# Patient Record
Sex: MALE | URBAN - NONMETROPOLITAN AREA
[De-identification: names, ages, dates, MRNs, and addresses within clinical notes are randomized per-mention and may not be internally consistent; named-entity substitution may affect disease eponyms.]

---

## 2018-03-22 ENCOUNTER — INPATIENT HOSPITAL (OUTPATIENT)
Dept: URBAN - NONMETROPOLITAN AREA MEDICAL CENTER 2 | Facility: MEDICAL CENTER | Age: 63
Setting detail: OPHTHALMOLOGY
End: 2018-03-22
Payer: COMMERCIAL

## 2018-03-22 DIAGNOSIS — G45.9: ICD-10-CM

## 2018-03-22 PROCEDURE — 99222 1ST HOSP IP/OBS MODERATE 55: CPT | Performed by: OPHTHALMOLOGY

## 2020-04-15 ENCOUNTER — APPOINTMENT (EMERGENCY)
Dept: CT IMAGING | Facility: HOSPITAL | Age: 65
End: 2020-04-15
Payer: COMMERCIAL

## 2020-04-15 ENCOUNTER — HOSPITAL ENCOUNTER (EMERGENCY)
Facility: HOSPITAL | Age: 65
Discharge: HOME | End: 2020-04-15
Attending: EMERGENCY MEDICINE | Admitting: EMERGENCY MEDICINE
Payer: COMMERCIAL

## 2020-04-15 VITALS
OXYGEN SATURATION: 99 % | TEMPERATURE: 97.8 F | RESPIRATION RATE: 16 BRPM | DIASTOLIC BLOOD PRESSURE: 74 MMHG | SYSTOLIC BLOOD PRESSURE: 117 MMHG | WEIGHT: 192.02 LBS | HEART RATE: 67 BPM

## 2020-04-15 DIAGNOSIS — K57.92 DIVERTICULITIS: Primary | ICD-10-CM

## 2020-04-15 LAB
ALBUMIN SERPL BCP-MCNC: 3.8 G/DL (ref 3.5–5)
ALP SERPL-CCNC: 77 U/L (ref 46–116)
ALT SERPL W P-5'-P-CCNC: 28 U/L (ref 12–78)
ANION GAP SERPL CALCULATED.3IONS-SCNC: 10 MMOL/L (ref 4–13)
APTT PPP: 44 SECONDS (ref 23–37)
AST SERPL W P-5'-P-CCNC: 19 U/L (ref 5–45)
ATRIAL RATE: 75 BPM
BASOPHILS # BLD AUTO: 0.04 THOUSANDS/ΜL (ref 0–0.1)
BASOPHILS NFR BLD AUTO: 0 % (ref 0–1)
BILIRUB SERPL-MCNC: 0.55 MG/DL (ref 0.2–1)
BUN SERPL-MCNC: 15 MG/DL (ref 5–25)
CALCIUM SERPL-MCNC: 8.6 MG/DL (ref 8.3–10.1)
CHLORIDE SERPL-SCNC: 101 MMOL/L (ref 100–108)
CO2 SERPL-SCNC: 26 MMOL/L (ref 21–32)
CREAT SERPL-MCNC: 0.99 MG/DL (ref 0.6–1.3)
EOSINOPHIL # BLD AUTO: 0.12 THOUSAND/ΜL (ref 0–0.61)
EOSINOPHIL NFR BLD AUTO: 1 % (ref 0–6)
ERYTHROCYTE [DISTWIDTH] IN BLOOD BY AUTOMATED COUNT: 11.7 % (ref 11.6–15.1)
GFR SERPL CREATININE-BSD FRML MDRD: 80 ML/MIN/1.73SQ M
GLUCOSE SERPL-MCNC: 93 MG/DL (ref 65–140)
HCT VFR BLD AUTO: 45.2 % (ref 36.5–49.3)
HGB BLD-MCNC: 15.6 G/DL (ref 12–17)
IMM GRANULOCYTES # BLD AUTO: 0.02 THOUSAND/UL (ref 0–0.2)
IMM GRANULOCYTES NFR BLD AUTO: 0 % (ref 0–2)
INR PPP: 0.94 (ref 0.84–1.19)
LACTATE SERPL-SCNC: 0.7 MMOL/L (ref 0.5–2)
LIPASE SERPL-CCNC: 138 U/L (ref 73–393)
LYMPHOCYTES # BLD AUTO: 0.96 THOUSANDS/ΜL (ref 0.6–4.47)
LYMPHOCYTES NFR BLD AUTO: 10 % (ref 14–44)
MCH RBC QN AUTO: 32 PG (ref 26.8–34.3)
MCHC RBC AUTO-ENTMCNC: 34.5 G/DL (ref 31.4–37.4)
MCV RBC AUTO: 93 FL (ref 82–98)
MONOCYTES # BLD AUTO: 0.62 THOUSAND/ΜL (ref 0.17–1.22)
MONOCYTES NFR BLD AUTO: 6 % (ref 4–12)
NEUTROPHILS # BLD AUTO: 8.38 THOUSANDS/ΜL (ref 1.85–7.62)
NEUTS SEG NFR BLD AUTO: 83 % (ref 43–75)
NRBC BLD AUTO-RTO: 0 /100 WBCS
P AXIS: 72 DEGREES
PLATELET # BLD AUTO: 235 THOUSANDS/UL (ref 149–390)
PMV BLD AUTO: 9 FL (ref 8.9–12.7)
POTASSIUM SERPL-SCNC: 3.8 MMOL/L (ref 3.5–5.3)
PR INTERVAL: 156 MS
PROT SERPL-MCNC: 8.5 G/DL (ref 6.4–8.2)
PROTHROMBIN TIME: 12.6 SECONDS (ref 11.6–14.5)
QRS AXIS: 46 DEGREES
QRSD INTERVAL: 94 MS
QT INTERVAL: 416 MS
QTC INTERVAL: 464 MS
RBC # BLD AUTO: 4.87 MILLION/UL (ref 3.88–5.62)
SODIUM SERPL-SCNC: 137 MMOL/L (ref 136–145)
T WAVE AXIS: 32 DEGREES
TROPONIN I SERPL-MCNC: <0.02 NG/ML
VENTRICULAR RATE: 75 BPM
WBC # BLD AUTO: 10.14 THOUSAND/UL (ref 4.31–10.16)

## 2020-04-15 PROCEDURE — 80053 COMPREHEN METABOLIC PANEL: CPT | Performed by: EMERGENCY MEDICINE

## 2020-04-15 PROCEDURE — 96361 HYDRATE IV INFUSION ADD-ON: CPT

## 2020-04-15 PROCEDURE — 85610 PROTHROMBIN TIME: CPT | Performed by: EMERGENCY MEDICINE

## 2020-04-15 PROCEDURE — 96365 THER/PROPH/DIAG IV INF INIT: CPT

## 2020-04-15 PROCEDURE — 83690 ASSAY OF LIPASE: CPT | Performed by: EMERGENCY MEDICINE

## 2020-04-15 PROCEDURE — 36415 COLL VENOUS BLD VENIPUNCTURE: CPT | Performed by: EMERGENCY MEDICINE

## 2020-04-15 PROCEDURE — 93010 ELECTROCARDIOGRAM REPORT: CPT | Performed by: INTERNAL MEDICINE

## 2020-04-15 PROCEDURE — 93005 ELECTROCARDIOGRAM TRACING: CPT

## 2020-04-15 PROCEDURE — 99284 EMERGENCY DEPT VISIT MOD MDM: CPT | Performed by: EMERGENCY MEDICINE

## 2020-04-15 PROCEDURE — 99284 EMERGENCY DEPT VISIT MOD MDM: CPT

## 2020-04-15 PROCEDURE — 83605 ASSAY OF LACTIC ACID: CPT | Performed by: EMERGENCY MEDICINE

## 2020-04-15 PROCEDURE — 85025 COMPLETE CBC W/AUTO DIFF WBC: CPT | Performed by: EMERGENCY MEDICINE

## 2020-04-15 PROCEDURE — 85730 THROMBOPLASTIN TIME PARTIAL: CPT | Performed by: EMERGENCY MEDICINE

## 2020-04-15 PROCEDURE — 74176 CT ABD & PELVIS W/O CONTRAST: CPT

## 2020-04-15 PROCEDURE — 84484 ASSAY OF TROPONIN QUANT: CPT | Performed by: EMERGENCY MEDICINE

## 2020-04-15 RX ORDER — AMOXICILLIN AND CLAVULANATE POTASSIUM 875; 125 MG/1; MG/1
1 TABLET, FILM COATED ORAL EVERY 12 HOURS
Qty: 20 TABLET | Refills: 0 | Status: SHIPPED | OUTPATIENT
Start: 2020-04-15 | End: 2020-04-25

## 2020-04-15 RX ADMIN — SODIUM CHLORIDE 1000 ML: 0.9 INJECTION, SOLUTION INTRAVENOUS at 12:11

## 2020-04-15 RX ADMIN — Medication 3.38 G: at 12:58

## 2020-06-09 ENCOUNTER — ANESTHESIA EVENT (OUTPATIENT)
Dept: GASTROENTEROLOGY | Facility: HOSPITAL | Age: 65
End: 2020-06-09

## 2020-06-11 DIAGNOSIS — Z01.818 PRE-OP TESTING: Primary | ICD-10-CM

## 2020-06-12 ENCOUNTER — APPOINTMENT (OUTPATIENT)
Dept: LAB | Facility: HOSPITAL | Age: 65
End: 2020-06-12

## 2020-06-12 PROCEDURE — U0003 INFECTIOUS AGENT DETECTION BY NUCLEIC ACID (DNA OR RNA); SEVERE ACUTE RESPIRATORY SYNDROME CORONAVIRUS 2 (SARS-COV-2) (CORONAVIRUS DISEASE [COVID-19]), AMPLIFIED PROBE TECHNIQUE, MAKING USE OF HIGH THROUGHPUT TECHNOLOGIES AS DESCRIBED BY CMS-2020-01-R: HCPCS

## 2020-06-12 RX ORDER — DICYCLOMINE HYDROCHLORIDE 10 MG/1
10 CAPSULE ORAL
COMMUNITY
End: 2021-07-20 | Stop reason: ALTCHOICE

## 2020-06-12 RX ORDER — FLUTICASONE PROPIONATE 50 MCG
1 SPRAY, SUSPENSION (ML) NASAL DAILY
COMMUNITY
End: 2021-07-20 | Stop reason: ALTCHOICE

## 2020-06-15 ENCOUNTER — TRANSCRIBE ORDERS (OUTPATIENT)
Dept: LAB | Facility: HOSPITAL | Age: 65
End: 2020-06-15

## 2020-06-15 DIAGNOSIS — Z01.818 OTHER SPECIFIED PRE-OPERATIVE EXAMINATION: Primary | ICD-10-CM

## 2020-06-15 DIAGNOSIS — Z20.822 EXPOSURE TO 2019 NOVEL CORONAVIRUS: ICD-10-CM

## 2020-06-15 LAB — SARS-COV-2 N GENE RESP QL NAA+PROBE: NEGATIVE

## 2020-06-17 ENCOUNTER — ANESTHESIA (OUTPATIENT)
Dept: GASTROENTEROLOGY | Facility: HOSPITAL | Age: 65
End: 2020-06-17

## 2020-06-17 ENCOUNTER — HOSPITAL ENCOUNTER (OUTPATIENT)
Dept: GASTROENTEROLOGY | Facility: HOSPITAL | Age: 65
Setting detail: OUTPATIENT SURGERY
Discharge: HOME | End: 2020-06-17
Attending: INTERNAL MEDICINE | Admitting: INTERNAL MEDICINE
Payer: COMMERCIAL

## 2020-06-17 VITALS
WEIGHT: 192 LBS | DIASTOLIC BLOOD PRESSURE: 85 MMHG | BODY MASS INDEX: 27.49 KG/M2 | SYSTOLIC BLOOD PRESSURE: 143 MMHG | OXYGEN SATURATION: 96 % | RESPIRATION RATE: 18 BRPM | TEMPERATURE: 97.5 F | HEART RATE: 63 BPM | HEIGHT: 70 IN

## 2020-06-17 DIAGNOSIS — Z11.59 SCREENING FOR VIRAL DISEASE: Primary | ICD-10-CM

## 2020-06-17 DIAGNOSIS — R10.13 EPIGASTRIC PAIN: ICD-10-CM

## 2020-06-17 DIAGNOSIS — Z01.818 PRE-OP TESTING: ICD-10-CM

## 2020-06-17 DIAGNOSIS — K57.32 DIVERTICULITIS OF LARGE INTESTINE WITHOUT PERFORATION OR ABSCESS WITHOUT BLEEDING: ICD-10-CM

## 2020-06-17 PROCEDURE — 88305 TISSUE EXAM BY PATHOLOGIST: CPT | Performed by: PATHOLOGY

## 2020-06-17 RX ORDER — ONDANSETRON 2 MG/ML
4 INJECTION INTRAMUSCULAR; INTRAVENOUS ONCE AS NEEDED
Status: CANCELLED | OUTPATIENT
Start: 2020-06-17

## 2020-06-17 RX ORDER — TRAMADOL HYDROCHLORIDE 50 MG/1
50 TABLET ORAL EVERY 6 HOURS PRN
COMMUNITY
Start: 2018-03-13 | End: 2021-04-02

## 2020-06-17 RX ORDER — PROMETHAZINE HYDROCHLORIDE 25 MG/ML
12.5 INJECTION, SOLUTION INTRAMUSCULAR; INTRAVENOUS ONCE AS NEEDED
Status: CANCELLED | OUTPATIENT
Start: 2020-06-17

## 2020-06-17 RX ORDER — SODIUM CHLORIDE, SODIUM LACTATE, POTASSIUM CHLORIDE, CALCIUM CHLORIDE 600; 310; 30; 20 MG/100ML; MG/100ML; MG/100ML; MG/100ML
125 INJECTION, SOLUTION INTRAVENOUS CONTINUOUS
Status: CANCELLED | OUTPATIENT
Start: 2020-06-17

## 2020-06-17 RX ORDER — SODIUM CHLORIDE, SODIUM LACTATE, POTASSIUM CHLORIDE, CALCIUM CHLORIDE 600; 310; 30; 20 MG/100ML; MG/100ML; MG/100ML; MG/100ML
INJECTION, SOLUTION INTRAVENOUS CONTINUOUS PRN
Status: DISCONTINUED | OUTPATIENT
Start: 2020-06-17 | End: 2020-06-17 | Stop reason: SURG

## 2020-06-17 RX ORDER — LIDOCAINE HYDROCHLORIDE 10 MG/ML
INJECTION, SOLUTION EPIDURAL; INFILTRATION; INTRACAUDAL; PERINEURAL AS NEEDED
Status: DISCONTINUED | OUTPATIENT
Start: 2020-06-17 | End: 2020-06-17 | Stop reason: SURG

## 2020-06-17 RX ORDER — FENTANYL CITRATE/PF 50 MCG/ML
25 SYRINGE (ML) INJECTION
Status: CANCELLED | OUTPATIENT
Start: 2020-06-17

## 2020-06-17 RX ORDER — MEPERIDINE HYDROCHLORIDE 25 MG/ML
12.5 INJECTION INTRAMUSCULAR; INTRAVENOUS; SUBCUTANEOUS ONCE
Status: CANCELLED | OUTPATIENT
Start: 2020-06-17 | End: 2020-06-17

## 2020-06-17 RX ORDER — ALBUTEROL SULFATE 2.5 MG/3ML
2.5 SOLUTION RESPIRATORY (INHALATION) ONCE AS NEEDED
Status: CANCELLED | OUTPATIENT
Start: 2020-06-17

## 2020-06-17 RX ORDER — ASPIRIN 325 MG
325 TABLET ORAL DAILY
COMMUNITY
Start: 2018-12-13 | End: 2021-07-21 | Stop reason: HOSPADM

## 2020-06-17 RX ORDER — LABETALOL 20 MG/4 ML (5 MG/ML) INTRAVENOUS SYRINGE
5
Status: CANCELLED | OUTPATIENT
Start: 2020-06-17

## 2020-06-17 RX ORDER — PROPOFOL 10 MG/ML
INJECTION, EMULSION INTRAVENOUS AS NEEDED
Status: DISCONTINUED | OUTPATIENT
Start: 2020-06-17 | End: 2020-06-17 | Stop reason: SURG

## 2020-06-17 RX ORDER — VERAPAMIL HYDROCHLORIDE 120 MG/1
120 CAPSULE, EXTENDED RELEASE ORAL DAILY
COMMUNITY
Start: 2019-01-17 | End: 2021-07-21 | Stop reason: HOSPADM

## 2020-06-17 RX ORDER — HYDROMORPHONE HCL/PF 1 MG/ML
0.5 SYRINGE (ML) INJECTION
Status: CANCELLED | OUTPATIENT
Start: 2020-06-17

## 2020-06-17 RX ORDER — BACLOFEN 20 MG/1
20 TABLET ORAL 3 TIMES DAILY PRN
COMMUNITY
Start: 2018-07-24

## 2020-06-17 RX ADMIN — PROPOFOL 50 MG: 10 INJECTION, EMULSION INTRAVENOUS at 12:23

## 2020-06-17 RX ADMIN — PROPOFOL 40 MG: 10 INJECTION, EMULSION INTRAVENOUS at 12:26

## 2020-06-17 RX ADMIN — PROPOFOL 40 MG: 10 INJECTION, EMULSION INTRAVENOUS at 12:29

## 2020-06-17 RX ADMIN — PROPOFOL 50 MG: 10 INJECTION, EMULSION INTRAVENOUS at 12:19

## 2020-06-17 RX ADMIN — PROPOFOL 40 MG: 10 INJECTION, EMULSION INTRAVENOUS at 12:36

## 2020-06-17 RX ADMIN — PROPOFOL 40 MG: 10 INJECTION, EMULSION INTRAVENOUS at 12:33

## 2020-06-17 RX ADMIN — PROPOFOL 100 MG: 10 INJECTION, EMULSION INTRAVENOUS at 12:16

## 2020-06-17 RX ADMIN — PROPOFOL 40 MG: 10 INJECTION, EMULSION INTRAVENOUS at 12:42

## 2020-06-17 RX ADMIN — SODIUM CHLORIDE, SODIUM LACTATE, POTASSIUM CHLORIDE, AND CALCIUM CHLORIDE: .6; .31; .03; .02 INJECTION, SOLUTION INTRAVENOUS at 12:11

## 2020-06-17 RX ADMIN — PROPOFOL 40 MG: 10 INJECTION, EMULSION INTRAVENOUS at 12:47

## 2020-06-17 RX ADMIN — LIDOCAINE HYDROCHLORIDE 50 MG: 10 INJECTION, SOLUTION EPIDURAL; INFILTRATION; INTRACAUDAL; PERINEURAL at 12:16

## 2020-07-20 ENCOUNTER — TELEPHONE (OUTPATIENT)
Dept: OTHER | Facility: HOSPITAL | Age: 65
End: 2020-07-20

## 2020-07-20 NOTE — TELEPHONE ENCOUNTER
Called  services and [facility]  Alyssa Whittaker assisted in calling patient's listed mobile and home numbers  No answers, and she left messages to call back to office at office number to review colonoscopy pathology results, see below  Notable for 10 colon polyps, all tubular adenomas, inadequate bowel prep with areas of solid stool  Plan  Recommend repeat colonoscopy within 6 months for full colorectal cancer screening  High fiber diet/supplement for diverticulosis and constipation  Will try to call patient again next week with [facility]  if he does not call back to office sooner  A   Colon, descending, polyps:     - Tubular adenomas, multiple fragments      - No high grade dysplasia or carcinoma identified      B  Colon, ascending, polyp:     - Tubular adenoma  - No high grade dysplasia or carcinoma identified      C  Colon, transverse, polyp:     - Tubular adenoma, fragments      - No high grade dysplasia or carcinoma identified      D  Colon, sigmoid, polyps:     - Tubular adenomas, multiple fragments      - No high grade dysplasia or carcinoma identified      E  Colon, rectosigmoid, polyp:     - Tubular adenoma, fragments      - No high grade dysplasia or carcinoma identified

## 2020-10-16 ENCOUNTER — CONSULT (OUTPATIENT)
Dept: PAIN MEDICINE | Facility: CLINIC | Age: 65
End: 2020-10-16
Payer: MEDICARE

## 2020-10-16 ENCOUNTER — HOSPITAL ENCOUNTER (OUTPATIENT)
Dept: RADIOLOGY | Facility: CLINIC | Age: 65
Discharge: HOME | End: 2020-10-16
Payer: MEDICARE

## 2020-10-16 VITALS
HEART RATE: 64 BPM | BODY MASS INDEX: 27.2 KG/M2 | DIASTOLIC BLOOD PRESSURE: 70 MMHG | WEIGHT: 190 LBS | TEMPERATURE: 97.5 F | HEIGHT: 70 IN | SYSTOLIC BLOOD PRESSURE: 110 MMHG

## 2020-10-16 DIAGNOSIS — M47.812 FACET ARTHROPATHY, CERVICAL: ICD-10-CM

## 2020-10-16 DIAGNOSIS — M25.562 CHRONIC PAIN OF BOTH KNEES: ICD-10-CM

## 2020-10-16 DIAGNOSIS — M25.561 CHRONIC PAIN OF BOTH KNEES: ICD-10-CM

## 2020-10-16 DIAGNOSIS — M47.812 FACET ARTHROPATHY, CERVICAL: Primary | ICD-10-CM

## 2020-10-16 DIAGNOSIS — M54.16 LUMBAR RADICULOPATHY: ICD-10-CM

## 2020-10-16 DIAGNOSIS — G89.29 CHRONIC PAIN OF BOTH KNEES: ICD-10-CM

## 2020-10-16 PROCEDURE — 99214 OFFICE O/P EST MOD 30 MIN: CPT | Performed by: ANESTHESIOLOGY

## 2020-10-16 PROCEDURE — 72050 X-RAY EXAM NECK SPINE 4/5VWS: CPT

## 2020-10-16 PROCEDURE — 73562 X-RAY EXAM OF KNEE 3: CPT

## 2020-11-09 ENCOUNTER — HOSPITAL ENCOUNTER (OUTPATIENT)
Facility: HOSPITAL | Age: 65
Setting detail: OUTPATIENT SURGERY
Discharge: HOME | End: 2020-11-09
Attending: SURGERY | Admitting: SURGERY
Payer: MEDICARE

## 2020-11-09 ENCOUNTER — ANESTHESIA EVENT (OUTPATIENT)
Dept: PERIOP | Facility: HOSPITAL | Age: 65
End: 2020-11-09
Payer: MEDICARE

## 2020-11-09 ENCOUNTER — ANESTHESIA (OUTPATIENT)
Dept: PERIOP | Facility: HOSPITAL | Age: 65
End: 2020-11-09
Payer: MEDICARE

## 2020-11-09 VITALS — HEART RATE: 70 BPM

## 2020-11-09 VITALS
HEART RATE: 58 BPM | TEMPERATURE: 98 F | WEIGHT: 190 LBS | SYSTOLIC BLOOD PRESSURE: 130 MMHG | RESPIRATION RATE: 18 BRPM | HEIGHT: 70 IN | OXYGEN SATURATION: 98 % | DIASTOLIC BLOOD PRESSURE: 72 MMHG | BODY MASS INDEX: 27.2 KG/M2

## 2020-11-09 DIAGNOSIS — D49.2 NEOPLASM OF UNSPECIFIED BEHAVIOR OF BONE, SOFT TISSUE, AND SKIN: ICD-10-CM

## 2020-11-09 DIAGNOSIS — S21.209A WOUND OF BACK, UNSPECIFIED LATERALITY, INITIAL ENCOUNTER: Primary | ICD-10-CM

## 2020-11-09 PROCEDURE — 88307 TISSUE EXAM BY PATHOLOGIST: CPT | Performed by: PATHOLOGY

## 2020-11-09 PROCEDURE — 11406 EXC TR-EXT B9+MARG >4.0 CM: CPT | Performed by: PHYSICIAN ASSISTANT

## 2020-11-09 RX ORDER — HYDROMORPHONE HCL/PF 1 MG/ML
0.2 SYRINGE (ML) INJECTION
Status: DISCONTINUED | OUTPATIENT
Start: 2020-11-09 | End: 2020-11-09 | Stop reason: HOSPADM

## 2020-11-09 RX ORDER — BUPIVACAINE HYDROCHLORIDE AND EPINEPHRINE 2.5; 5 MG/ML; UG/ML
INJECTION, SOLUTION INFILTRATION; PERINEURAL AS NEEDED
Status: DISCONTINUED | OUTPATIENT
Start: 2020-11-09 | End: 2020-11-09 | Stop reason: HOSPADM

## 2020-11-09 RX ORDER — MAGNESIUM HYDROXIDE 1200 MG/15ML
LIQUID ORAL AS NEEDED
Status: DISCONTINUED | OUTPATIENT
Start: 2020-11-09 | End: 2020-11-09 | Stop reason: HOSPADM

## 2020-11-09 RX ORDER — PROPOFOL 10 MG/ML
INJECTION, EMULSION INTRAVENOUS AS NEEDED
Status: DISCONTINUED | OUTPATIENT
Start: 2020-11-09 | End: 2020-11-09

## 2020-11-09 RX ORDER — SODIUM CHLORIDE, SODIUM LACTATE, POTASSIUM CHLORIDE, CALCIUM CHLORIDE 600; 310; 30; 20 MG/100ML; MG/100ML; MG/100ML; MG/100ML
INJECTION, SOLUTION INTRAVENOUS CONTINUOUS PRN
Status: DISCONTINUED | OUTPATIENT
Start: 2020-11-09 | End: 2020-11-09

## 2020-11-09 RX ORDER — LIDOCAINE HYDROCHLORIDE 10 MG/ML
0.5 INJECTION, SOLUTION EPIDURAL; INFILTRATION; INTRACAUDAL; PERINEURAL ONCE AS NEEDED
Status: DISCONTINUED | OUTPATIENT
Start: 2020-11-09 | End: 2020-11-09 | Stop reason: HOSPADM

## 2020-11-09 RX ORDER — MIDAZOLAM HYDROCHLORIDE 2 MG/2ML
INJECTION, SOLUTION INTRAMUSCULAR; INTRAVENOUS AS NEEDED
Status: DISCONTINUED | OUTPATIENT
Start: 2020-11-09 | End: 2020-11-09

## 2020-11-09 RX ORDER — LIDOCAINE HYDROCHLORIDE 10 MG/ML
INJECTION, SOLUTION EPIDURAL; INFILTRATION; INTRACAUDAL; PERINEURAL AS NEEDED
Status: DISCONTINUED | OUTPATIENT
Start: 2020-11-09 | End: 2020-11-09

## 2020-11-09 RX ORDER — PROPOFOL 10 MG/ML
INJECTION, EMULSION INTRAVENOUS CONTINUOUS PRN
Status: DISCONTINUED | OUTPATIENT
Start: 2020-11-09 | End: 2020-11-09

## 2020-11-09 RX ORDER — FENTANYL CITRATE/PF 50 MCG/ML
25 SYRINGE (ML) INJECTION
Status: DISCONTINUED | OUTPATIENT
Start: 2020-11-09 | End: 2020-11-09 | Stop reason: HOSPADM

## 2020-11-09 RX ORDER — FENTANYL CITRATE 50 UG/ML
INJECTION, SOLUTION INTRAMUSCULAR; INTRAVENOUS AS NEEDED
Status: DISCONTINUED | OUTPATIENT
Start: 2020-11-09 | End: 2020-11-09

## 2020-11-09 RX ORDER — ONDANSETRON 2 MG/ML
4 INJECTION INTRAMUSCULAR; INTRAVENOUS ONCE AS NEEDED
Status: DISCONTINUED | OUTPATIENT
Start: 2020-11-09 | End: 2020-11-09 | Stop reason: HOSPADM

## 2020-11-09 RX ADMIN — SODIUM CHLORIDE, SODIUM LACTATE, POTASSIUM CHLORIDE, AND CALCIUM CHLORIDE: .6; .31; .03; .02 INJECTION, SOLUTION INTRAVENOUS at 07:43

## 2020-11-09 RX ADMIN — MIDAZOLAM HYDROCHLORIDE 1 MG: 1 INJECTION, SOLUTION INTRAMUSCULAR; INTRAVENOUS at 07:47

## 2020-11-09 RX ADMIN — LIDOCAINE HYDROCHLORIDE 50 MG: 10 INJECTION, SOLUTION EPIDURAL; INFILTRATION; INTRACAUDAL; PERINEURAL at 07:47

## 2020-11-09 RX ADMIN — FENTANYL CITRATE 25 MCG: 50 INJECTION, SOLUTION INTRAMUSCULAR; INTRAVENOUS at 07:47

## 2020-11-09 RX ADMIN — FENTANYL CITRATE 25 MCG: 50 INJECTION, SOLUTION INTRAMUSCULAR; INTRAVENOUS at 08:00

## 2020-11-09 RX ADMIN — PROPOFOL 80 MCG/KG/MIN: 10 INJECTION, EMULSION INTRAVENOUS at 07:48

## 2020-11-09 RX ADMIN — PROPOFOL 40 MG: 10 INJECTION, EMULSION INTRAVENOUS at 07:48

## 2021-01-27 DIAGNOSIS — N28.1 CYST OF KIDNEY, ACQUIRED: ICD-10-CM

## 2021-02-03 ENCOUNTER — HOSPITAL ENCOUNTER (OUTPATIENT)
Dept: ULTRASOUND IMAGING | Facility: HOSPITAL | Age: 66
Discharge: HOME | End: 2021-02-03
Payer: MEDICARE

## 2021-02-03 DIAGNOSIS — N28.1 CYST OF KIDNEY, ACQUIRED: ICD-10-CM

## 2021-02-03 PROCEDURE — 76770 US EXAM ABDO BACK WALL COMP: CPT

## 2021-03-24 ENCOUNTER — HOSPITAL ENCOUNTER (EMERGENCY)
Facility: HOSPITAL | Age: 66
Discharge: HOME | End: 2021-03-24
Attending: EMERGENCY MEDICINE | Admitting: EMERGENCY MEDICINE
Payer: MEDICARE

## 2021-03-24 ENCOUNTER — APPOINTMENT (EMERGENCY)
Dept: CT IMAGING | Facility: HOSPITAL | Age: 66
End: 2021-03-24
Payer: MEDICARE

## 2021-03-24 VITALS
RESPIRATION RATE: 17 BRPM | BODY MASS INDEX: 28.72 KG/M2 | OXYGEN SATURATION: 98 % | SYSTOLIC BLOOD PRESSURE: 115 MMHG | TEMPERATURE: 97.5 F | WEIGHT: 200.18 LBS | DIASTOLIC BLOOD PRESSURE: 79 MMHG | HEART RATE: 64 BPM

## 2021-03-24 DIAGNOSIS — S32.030A CLOSED COMPRESSION FRACTURE OF L3 LUMBAR VERTEBRA, INITIAL ENCOUNTER (HCC): Primary | ICD-10-CM

## 2021-03-24 LAB
ALBUMIN SERPL BCP-MCNC: 3.6 G/DL (ref 3.5–5)
ALP SERPL-CCNC: 82 U/L (ref 46–116)
ALT SERPL W P-5'-P-CCNC: 25 U/L (ref 12–78)
ANION GAP SERPL CALCULATED.3IONS-SCNC: 8 MMOL/L (ref 4–13)
AST SERPL W P-5'-P-CCNC: 15 U/L (ref 5–45)
BACTERIA UR QL AUTO: ABNORMAL /HPF
BASOPHILS # BLD AUTO: 0.05 THOUSANDS/ΜL (ref 0–0.1)
BASOPHILS NFR BLD AUTO: 1 % (ref 0–1)
BILIRUB SERPL-MCNC: 0.53 MG/DL (ref 0.2–1)
BILIRUB UR QL STRIP: ABNORMAL
BUN SERPL-MCNC: 22 MG/DL (ref 5–25)
CALCIUM SERPL-MCNC: 8.8 MG/DL (ref 8.3–10.1)
CAOX CRY URNS QL MICRO: ABNORMAL /HPF
CHLORIDE SERPL-SCNC: 104 MMOL/L (ref 100–108)
CLARITY UR: CLEAR
CO2 SERPL-SCNC: 27 MMOL/L (ref 21–32)
COARSE GRAN CASTS URNS QL MICRO: ABNORMAL /LPF
COLOR UR: YELLOW
CREAT SERPL-MCNC: 1.04 MG/DL (ref 0.6–1.3)
EOSINOPHIL # BLD AUTO: 0.12 THOUSAND/ΜL (ref 0–0.61)
EOSINOPHIL NFR BLD AUTO: 2 % (ref 0–6)
ERYTHROCYTE [DISTWIDTH] IN BLOOD BY AUTOMATED COUNT: 12 % (ref 11.6–15.1)
GFR SERPL CREATININE-BSD FRML MDRD: 75 ML/MIN/1.73SQ M
GLUCOSE SERPL-MCNC: 94 MG/DL (ref 65–140)
GLUCOSE UR STRIP-MCNC: NEGATIVE MG/DL
HCT VFR BLD AUTO: 45.3 % (ref 36.5–49.3)
HGB BLD-MCNC: 15.1 G/DL (ref 12–17)
HGB UR QL STRIP.AUTO: ABNORMAL
HYALINE CASTS #/AREA URNS LPF: ABNORMAL /LPF
IMM GRANULOCYTES # BLD AUTO: 0.01 THOUSAND/UL (ref 0–0.2)
IMM GRANULOCYTES NFR BLD AUTO: 0 % (ref 0–2)
KETONES UR STRIP-MCNC: NEGATIVE MG/DL
LEUKOCYTE ESTERASE UR QL STRIP: NEGATIVE
LYMPHOCYTES # BLD AUTO: 1.9 THOUSANDS/ΜL (ref 0.6–4.47)
LYMPHOCYTES NFR BLD AUTO: 32 % (ref 14–44)
MCH RBC QN AUTO: 31.1 PG (ref 26.8–34.3)
MCHC RBC AUTO-ENTMCNC: 33.3 G/DL (ref 31.4–37.4)
MCV RBC AUTO: 93 FL (ref 82–98)
MONOCYTES # BLD AUTO: 0.68 THOUSAND/ΜL (ref 0.17–1.22)
MONOCYTES NFR BLD AUTO: 11 % (ref 4–12)
MUCOUS THREADS UR QL AUTO: ABNORMAL
NEUTROPHILS # BLD AUTO: 3.26 THOUSANDS/ΜL (ref 1.85–7.62)
NEUTS SEG NFR BLD AUTO: 54 % (ref 43–75)
NITRITE UR QL STRIP: NEGATIVE
NON-SQ EPI CELLS URNS QL MICRO: ABNORMAL /HPF
NRBC BLD AUTO-RTO: 0 /100 WBCS
PH UR STRIP.AUTO: 5.5 [PH]
PLATELET # BLD AUTO: 245 THOUSANDS/UL (ref 149–390)
PMV BLD AUTO: 9 FL (ref 8.9–12.7)
POTASSIUM SERPL-SCNC: 4 MMOL/L (ref 3.5–5.3)
PROT SERPL-MCNC: 7.9 G/DL (ref 6.4–8.2)
PROT UR STRIP-MCNC: ABNORMAL MG/DL
RBC # BLD AUTO: 4.85 MILLION/UL (ref 3.88–5.62)
RBC #/AREA URNS AUTO: ABNORMAL /HPF
SODIUM SERPL-SCNC: 139 MMOL/L (ref 136–145)
SP GR UR STRIP.AUTO: 1.02 (ref 1–1.03)
UROBILINOGEN UR QL STRIP.AUTO: 1 E.U./DL
WBC # BLD AUTO: 6.02 THOUSAND/UL (ref 4.31–10.16)
WBC #/AREA URNS AUTO: ABNORMAL /HPF

## 2021-03-24 PROCEDURE — 74176 CT ABD & PELVIS W/O CONTRAST: CPT

## 2021-03-24 PROCEDURE — 99284 EMERGENCY DEPT VISIT MOD MDM: CPT

## 2021-03-24 PROCEDURE — 36415 COLL VENOUS BLD VENIPUNCTURE: CPT | Performed by: PHYSICIAN ASSISTANT

## 2021-03-24 PROCEDURE — 81001 URINALYSIS AUTO W/SCOPE: CPT | Performed by: PHYSICIAN ASSISTANT

## 2021-03-24 PROCEDURE — 96361 HYDRATE IV INFUSION ADD-ON: CPT

## 2021-03-24 PROCEDURE — 99284 EMERGENCY DEPT VISIT MOD MDM: CPT | Performed by: EMERGENCY MEDICINE

## 2021-03-24 PROCEDURE — 80053 COMPREHEN METABOLIC PANEL: CPT | Performed by: PHYSICIAN ASSISTANT

## 2021-03-24 PROCEDURE — 85025 COMPLETE CBC W/AUTO DIFF WBC: CPT | Performed by: PHYSICIAN ASSISTANT

## 2021-03-24 PROCEDURE — 96374 THER/PROPH/DIAG INJ IV PUSH: CPT

## 2021-03-24 RX ORDER — KETOROLAC TROMETHAMINE 30 MG/ML
15 INJECTION, SOLUTION INTRAMUSCULAR; INTRAVENOUS ONCE
Status: COMPLETED | OUTPATIENT
Start: 2021-03-24 | End: 2021-03-24

## 2021-03-24 RX ORDER — CYCLOBENZAPRINE HCL 10 MG
10 TABLET ORAL 2 TIMES DAILY PRN
Qty: 20 TABLET | Refills: 0 | Status: SHIPPED | OUTPATIENT
Start: 2021-03-24

## 2021-03-24 RX ORDER — TRAMADOL HYDROCHLORIDE 50 MG/1
50 TABLET ORAL EVERY 6 HOURS PRN
Qty: 12 TABLET | Refills: 0 | Status: SHIPPED | OUTPATIENT
Start: 2021-03-24 | End: 2021-03-27

## 2021-03-24 RX ORDER — LIDOCAINE 50 MG/G
2 PATCH TOPICAL ONCE
Status: DISCONTINUED | OUTPATIENT
Start: 2021-03-24 | End: 2021-03-24 | Stop reason: HOSPADM

## 2021-03-24 RX ORDER — LIDOCAINE 50 MG/G
2 PATCH TOPICAL DAILY
Qty: 10 PATCH | Refills: 0 | Status: SHIPPED | OUTPATIENT
Start: 2021-03-24 | End: 2021-07-20 | Stop reason: ALTCHOICE

## 2021-03-24 RX ADMIN — LIDOCAINE 2 PATCH: 50 PATCH TOPICAL at 10:58

## 2021-03-24 RX ADMIN — SODIUM CHLORIDE 500 ML: 0.9 INJECTION, SOLUTION INTRAVENOUS at 10:57

## 2021-03-24 RX ADMIN — KETOROLAC TROMETHAMINE 15 MG: 30 INJECTION, SOLUTION INTRAMUSCULAR at 10:58

## 2021-03-24 NOTE — ED ATTENDING ATTESTATION
3/24/2021  I, oNrbert Car MD, saw and evaluated the patient  I have discussed the patient with the resident/non-physician practitioner and agree with the resident's/non-physician practitioner's findings, Plan of Care, and MDM as documented in the resident's/non-physician practitioner's note, except where noted  All available labs and Radiology studies were reviewed  I was present for key portions of any procedure(s) performed by the resident/non-physician practitioner and I was immediately available to provide assistance  At this point I agree with the current assessment done in the Emergency Department        ED Course         Critical Care Time  Procedures

## 2021-03-24 NOTE — PHYSICAL THERAPY NOTE
PHYSICAL THERAPY ORTHOTIC NOTE          Patient Name: Devendra Noyola  VIDMD'I Date: 3/24/2021     [id number]: 25'    Received order for TLSO brace  Chart reviewed  Pt with Mild acute to subacute compression fracture superior endplate of L3  Spoke with Bj Goodman PA-C regarding type of bracing recommended by ortho, Dr Love Lincoln  Spoke with Dr Love Lincoln via phone  Discussed L3 fracture and recommendation for LSO instead of TLSO  Dr Love Lincoln verbally consented to LSO brace as he was "in the operating room"  Spoke with AMRITA about changing brace order to LSO as Dr Love Lincoln verbally consented to this  Explained reason for bracing consult  Pt fitted for LSO at this time  Verbal and visual instruction provided to patient for proper positioning and fitting of brace  Educated patient to don/doff brace sitting on EOB, and wearing when OOB  Verbalized understanding  Pt reports having a shower chair at home to complete bathing  Recommend use of shower chair  Pt verbalized understanding  Educated patient on spinal precautions and importance of log rolling in and out of bed  Further educated patient on proper technique to complete LB dressing to maintain spinal precautions  Pt verbalized and demonstrated understanding  After fitting patient for LSO, patient demonstrated ability to doff and don properly  Pt reporting he plans to see chiropractor for massage  Advised to notify chiropractor of fracture  No further questions or concerns regarding bracing  Directed patient to question AMRITA regarding further information about fracture       Alan Harkins, PT,DPT
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
## 2021-03-24 NOTE — DISCHARGE INSTRUCTIONS
You were found today to have a L3 compression fracture     NARCOTIC PAIN MEDICINE INSTRUCTIONS:    You have been prescribed a narcotic pain medicine  This type of medicine is not like other medications and you should understand additional information regarding it  You have been provided only a limited supply of this medicine and if you need more you must seen your Primary Care Provider &/or your Pain Therapist, if you have one  Please read the following information  If you have any questions, please do not leave here until you have your questions answered  1   The pain medicine will not likely make all of your pain go away, but it will hopefully take the edge off of your pain  2   Use the narcotic pain medicine only as directed! Be sure to avoid taking this medicine on an empty stomach  Keep yourself well hydrated when taking this medicine  3   Narcotic can cause sedation (sleepiness) and delayed reactions which puts you and those around you at risk of injury or death  You must not drive any vehicle, operate any machinery, make any important/life-changing or legal decisions, participate in physical activities, drink alcohol or take any other medicines that can cause drossiness  4   If you feel excessively tired or unable to coordinate your activities DO NOT TAKE any more of this medication! 5  Narcotic pain medications taken regularly will cause constipation  If you need to take this medication regularly use an over-the-counter stool softener such as docusate, (Colace) or laxatives called Milk of Magnesia or Mirilax following the 's directions  6    While taking the narcotic pain medications keep it in a secure location in your home  Avoid leaving them in common areas where others can mistakenly take them- including unsuspecting children  Also, please understand that narcotics are a sought after "street drug," and criminals may seek to steal them from you    If you have this medicine stolen, please contact local law enforcement and have a report of the event filed  Ask to have a copy of the report because your healthcare providers will want to review it  7   Any remaining tablets or pills must be disposed of carefully  Most pharmacies will accept returned tablets or pills  They will destroy them using safe methods  Do not flush them down the toilet!

## 2021-04-02 ENCOUNTER — CONSULT (OUTPATIENT)
Dept: PAIN MEDICINE | Facility: CLINIC | Age: 66
End: 2021-04-02
Payer: MEDICARE

## 2021-04-02 VITALS
TEMPERATURE: 97.6 F | DIASTOLIC BLOOD PRESSURE: 58 MMHG | SYSTOLIC BLOOD PRESSURE: 108 MMHG | HEART RATE: 66 BPM | HEIGHT: 71 IN | WEIGHT: 205.6 LBS | BODY MASS INDEX: 28.78 KG/M2 | RESPIRATION RATE: 20 BRPM

## 2021-04-02 DIAGNOSIS — M80.00XS AGE-RELATED OSTEOPOROSIS WITH CURRENT PATHOLOGICAL FRACTURE, SEQUELA: Primary | ICD-10-CM

## 2021-04-02 DIAGNOSIS — S32.030A CLOSED COMPRESSION FRACTURE OF L3 LUMBAR VERTEBRA, INITIAL ENCOUNTER (HCC): ICD-10-CM

## 2021-04-02 PROCEDURE — 99214 OFFICE O/P EST MOD 30 MIN: CPT | Performed by: ANESTHESIOLOGY

## 2021-04-02 RX ORDER — DICLOFENAC POTASSIUM 50 MG/1
TABLET, FILM COATED ORAL
COMMUNITY
Start: 2021-01-27 | End: 2021-04-02

## 2021-04-02 RX ORDER — ATORVASTATIN CALCIUM 40 MG/1
40 TABLET, FILM COATED ORAL
COMMUNITY
Start: 2021-01-27

## 2021-04-02 RX ORDER — FEXOFENADINE HCL 180 MG/1
TABLET ORAL
COMMUNITY
Start: 2021-01-27 | End: 2021-07-20 | Stop reason: ALTCHOICE

## 2021-04-02 RX ORDER — RIZATRIPTAN BENZOATE 10 MG/1
TABLET ORAL
COMMUNITY
Start: 2021-01-27

## 2021-04-02 RX ORDER — HYDROCODONE BITARTRATE AND ACETAMINOPHEN 5; 325 MG/1; MG/1
1 TABLET ORAL EVERY 8 HOURS PRN
Qty: 90 TABLET | Refills: 0 | Status: SHIPPED | OUTPATIENT
Start: 2021-04-02

## 2021-04-02 RX ORDER — FLUTICASONE FUROATE 200 UG/1
POWDER RESPIRATORY (INHALATION)
COMMUNITY
Start: 2021-01-27

## 2021-04-02 RX ORDER — HYOSCYAMINE SULFATE 0.125 MG
0.12 TABLET ORAL
COMMUNITY
Start: 2020-10-15

## 2021-04-02 RX ORDER — NABUMETONE 500 MG/1
500 TABLET, FILM COATED ORAL
COMMUNITY
Start: 2020-10-15 | End: 2021-04-02

## 2021-04-02 RX ORDER — TAMSULOSIN HYDROCHLORIDE 0.4 MG/1
0.4 CAPSULE ORAL
COMMUNITY
Start: 2021-01-27

## 2021-04-02 RX ORDER — DOXEPIN HYDROCHLORIDE 100 MG/1
100 CAPSULE ORAL
COMMUNITY
Start: 2021-01-27

## 2021-04-02 RX ORDER — METOCLOPRAMIDE 5 MG/1
5 TABLET ORAL
COMMUNITY
Start: 2021-01-27 | End: 2021-07-20 | Stop reason: ALTCHOICE

## 2021-04-02 RX ORDER — INCONTINENCE PAD,LINER,DISP
EACH MISCELLANEOUS
COMMUNITY
Start: 2020-10-15

## 2021-04-02 RX ORDER — HYDROXYZINE 50 MG/1
TABLET, FILM COATED ORAL
COMMUNITY
Start: 2021-01-27 | End: 2021-07-20 | Stop reason: ALTCHOICE

## 2021-04-02 RX ORDER — NITROGLYCERIN 0.4 MG/1
0.4 TABLET SUBLINGUAL
COMMUNITY
Start: 2021-01-27

## 2021-04-02 RX ORDER — CELECOXIB 100 MG/1
100 CAPSULE ORAL 2 TIMES DAILY
Qty: 60 CAPSULE | Refills: 0 | Status: SHIPPED | OUTPATIENT
Start: 2021-04-02

## 2021-04-02 NOTE — PATIENT INSTRUCTIONS
Vertebral Compression Fracture   WHAT YOU NEED TO KNOW:   A vertebral compression fracture (VCF) is a collapse or breakdown in a bone in your spine  Compression fractures happen when there is too much pressure on the vertebra  VCFs most often occur in the thoracic (middle) and lumbar (lower) areas of your spine  Fractures may be mild to severe  DISCHARGE INSTRUCTIONS:   Call your local emergency number (911 in the [address] [facility]) if:   · You feel lightheaded, short of breath, and have chest pain  · You cough up blood  · You suddenly cannot feel your legs  · You suddenly have trouble moving your arms or legs  Return to the emergency department if:   · Your arm or leg feels warm, tender, and painful  It may look swollen and red  · You have new problems urinating or having bowel movements  · You have severe pain in your back after falling, bending forward, sneezing, or coughing strongly  Call your doctor or orthopedist if:   · You cannot sleep or rest because of back pain  · You have pain or swelling in your back that is getting worse, or does not go away  · You have questions or concerns about your condition or care  Medicines: You may need any of the following:  · NSAIDs , such as ibuprofen, help decrease swelling, pain, and fever  This medicine is available with or without a doctor's order  NSAIDs can cause stomach bleeding or kidney problems in certain people  If you take blood thinner medicine, always ask if NSAIDs are safe for you  Always read the medicine label and follow directions  Do not give these medicines to children under 10months of age without direction from your child's healthcare provider  · Acetaminophen  decreases pain and fever  It is available without a doctor's order  Ask how much to take and how often to take it  Follow directions   Read the labels of all other medicines you are using to see if they also contain acetaminophen, or ask your doctor or pharmacist  Acetaminophen can cause liver damage if not taken correctly  Do not use more than 4 grams (4,000 milligrams) total of acetaminophen in one day  · Prescription pain medicine  may be given  Ask your healthcare provider how to take this medicine safely  Some prescription pain medicines contain acetaminophen  Do not take other medicines that contain acetaminophen without talking to your healthcare provider  Too much acetaminophen may cause liver damage  Prescription pain medicine may cause constipation  Ask your healthcare provider how to prevent or treat constipation  · Bisphosphonates and calcitonin  may be recommended to help your bones get stronger  They can decrease the pain of a VCF caused by osteoporosis, and decrease your risk for another fracture  · Take your medicine as directed  Contact your healthcare provider if you think your medicine is not helping or if you have side effects  Tell him or her if you are allergic to any medicine  Keep a list of the medicines, vitamins, and herbs you take  Include the amounts, and when and why you take them  Bring the list or the pill bottles to follow-up visits  Carry your medicine list with you in case of an emergency  Heat and ice:   · Apply ice  on your back for 15 to 20 minutes every hour or as directed  Use an ice pack, or put crushed ice in a plastic bag  Cover it with a towel before you apply it  Ice helps prevent tissue damage and decreases swelling and pain  · Apply heat  on your back for 20 to 30 minutes every 2 hours for as many days as directed  Heat helps decrease pain and muscle spasms  Activity:   · Avoid activities that may make the pain worse, such as picking up heavy objects  When the pain decreases, begin normal, slow movements as directed by your healthcare provider  Your healthcare provider may have you do weight-bearing exercises such as walking  You may also do non-weight-bearing exercises such as swimming and bicycling      · You may need to use a walker or cane  Ask your healthcare provider for more information about how to use a cane or a walker  · When you  objects, bend at the hips and knees  Never bend from the waist only  Use bent knees and your leg muscles as you lift the object  While you lift the object, keep it close to your chest  Try not to twist or lift anything above your waist     Physical and occupational therapy:  Your healthcare provider may recommend you start or continue one or both types of therapy  A physical therapist teaches you exercises to help improve movement and strength, and to decrease pain  An occupational therapist teaches you skills to help with your daily activities  Manage pain during sleep:   · Do not sleep on a waterbed  Waterbeds do not provide good back support  · Sleep on a firm mattress  You may also put a ½ to 1-inch piece of plywood between the mattress and box spring  · Sleep on your back with a pillow under your knees  This will decrease pressure on your back  You may also sleep on your side with 1 or both of your knees bent and a pillow between them  It may also be helpful to sleep on your stomach with a pillow under you at waist level  Follow up with your doctor or orthopedist as directed: You may need to return for x-rays or other tests  Write down your questions so you remember to ask them during your visits  © Copyright [address] Information is for End User's use only and may not be sold, redistributed or otherwise used for commercial purposes  All illustrations and images included in CareNotes® are the copyrighted property of [employer]  or [address] [place] [address]  The above information is an  only  It is not intended as medical advice for individual conditions or treatments  Talk to your doctor, nurse or pharmacist before following any medical regimen to see if it is safe and effective for you    Opioid Safety   WHAT YOU NEED TO KNOW:   What do I need to know about opioid safety? Safety includes the correct use, storage, and disposal of opioids  Examples of opioid pain medicines are oxycodone, morphine, fentanyl, and codeine  How are opioids given? Opioids can be given as a pill, patch, or suppository  They can also be given as an injection into a vein, near a nerve, or into a joint  Your prescription may include one or both of the following:  · Short-acting opioids  work fast and relieve pain for about 3 to 6 hours  They are often used for acute or breakthrough pain  · Long-acting opioids  usually last at least 8 hours  You can take them less often and they may be used for chronic pain  How do I use opioids safely? · Take prescribed opioids exactly as directed  Opioids come with directions based on the kind and how it is given  Talk to your healthcare provider or a pharmacist if you have any questions  Do not take more than the recommended amount  Too much can cause a life-threatening overdose  Do not continue to take it after your pain stops  You may develop tolerance  This means you keep needing higher doses to get the same effect  You may also develop opioid use disorder  This means you are not able to control your opioid use  · Do not give opioids to others or take opioids that belong to someone else  The kind or amount one person takes may not be right for another  The person you share them with may also be taking medicines that do not mix with opioids  He or she may drink alcohol or use other drugs that can cause life-threatening problems when mixed with opioids  · Do not mix opioids with other medicines or alcohol  The combination can cause an overdose, or cause you to stop breathing  Alcohol, sleeping pills, and medicines such as antihistamines can make you sleepy  A combination with opioids can lead to a coma  · Do not drive or operate heavy machinery after you use an opioid    You may feel drowsy or have trouble concentrating  You can injure yourself or others if you drive or use heavy machinery when you are not alert  Your provider or pharmacist can tell you how long to wait after a dose before you do these activities  · Talk to your healthcare provider if you have any side effects  Side effects include nausea, sleepiness, itching, and trouble thinking clearly  Your provider may need to make changes to the kind or amount of opioid you are taking  He or she can also help you find ways to prevent or relieve side effects  What can I do to manage constipation? Constipation is the most common side effect of opioid medicine  Constipation is when you have hard, dry bowel movements, or you go longer than usual between bowel movements  Tell your healthcare provider about all changes in your bowel movements while you are taking opioids  He or she may recommend laxative medicine to help you have a bowel movement  He or she may also change the kind of opioid you are taking, or change when you take it  The following are more ways you can prevent or relieve constipation:  · Drink liquids as directed  You may need to drink extra liquids to help soften and move your bowels  Ask how much liquid to drink each day and which liquids are best for you  · Eat high-fiber foods  This may help decrease constipation by adding bulk to your bowel movements  High-fiber foods include fruits, vegetables, whole-grain breads and cereals, and beans  Your healthcare provider or dietitian can help you create a high-fiber meal plan  Your provider may also recommend a fiber supplement if you cannot get enough fiber from food  · Exercise regularly  Regular physical activity can help stimulate your intestines  Walking is a good exercise to prevent or relieve constipation  Ask which exercises are best for you  · Schedule a time each day to have a bowel movement  This may help train your body to have regular bowel movements   [facility] forward while you are on the toilet to help move the bowel movement out  Sit on the toilet for at least 10 minutes, even if you do not have a bowel movement  How do I store opioids safely? · Store opioids where others cannot easily get them  Keep them in a locked cabinet or secure area  Do not  keep them in a purse or other bag you carry with you  A person may be looking for something else and find the opioids  · Make sure opioids are stored out of the reach of children  A child can easily overdose on opioids  Opioids may look like candy to a small child  What is the best way to dispose of opioids? The laws vary by country and area  In the [place], the best way is to return the opioids through a take-back program  This program is offered by the [place]  The following are options for using the program:  · Take the opioids to a YULIA collection site  The site is often a law enforcement center  Call your local law enforcement center for scheduled take-back days in your area  You will be given information on where to go if the collection site is in a different location  · Take the opioids to an approved pharmacy or hospital   A pharmacy or hospital may be set up as a collection site  You will need to ask if it is a YULIA collection site if you were not directed there  A pharmacy or doctor's office may not be able to take back opioids unless it is a YULIA site  · Use a mail-back system  This means you are given containers to put the opioids into  You will then mail them in the containers  · Use a take-back drop box  This is a place to leave the opioids at any time  People and animals will not be able to get into the box  Your local law enforcement agency can tell you where to find a drop box in your area  What are some other safe ways to dispose of opioids? The medicine may come with disposal instructions   The instructions may vary depending on the brand of medicine you are using  Instructions may come in a Medication Guide, but not every medicine has one  You may instead get instructions from your pharmacy or doctor  Follow instructions carefully  The following are general guidelines to follow:  · Find out if you can flush the opioid  Some opioids can be flushed down the toilet or poured into the sink  You will need to contact authorities in your area to see if this is an option for you  The FDA also offers a list of medicines that are safe to flush down the toilet  You can check the list if you cannot get the information for your local area  · Ask your waste management company about rules for putting opioids in the trash  The company will be able to give you specific directions  Scratch out personal information on the original medicine label so it cannot be read  Then put it in the trash  Do not label the trash or put any information on it about the opioids  It should look like regular household trash so no one is tempted to look for the opioids  Keep the trash out of the reach of children and animals  Always make sure trash is secure  · Talk to officials if you live in a facility  If you live in a nursing home or assisted living center, talk to an official  The person will know the rules for your area  What are some other ways to manage pain? · Ask your healthcare provider about non-opioid medicines to control pain  Some medicines may even work better than opioids, depending on the cause of your pain  Nonprescription medicines include NSAIDs (such as ibuprofen) and acetaminophen  Prescription medicines include muscle relaxers, antidepressants, and steroids  · Pain may be managed without any medicines  Some ways to relieve pain include massage, aromatherapy, or meditation  Physical or occupational therapy may also help  Where can I find more information?    · Drug Enforcement Administration  [facility] [address]  Phone: 0- 723 - [phone]  Web Address: [facility]  usdoj [email or web address]_[email or web address]    · [address]  and Drug Administration  [place] , [address]  Phone: 2- 718 - [phone]  Web Address: http://[employer]/  Call your local emergency number (911 in the [address]), or have someone else call if:   · You have a seizure  · You cannot be woken  · You have trouble staying awake and your breathing is slow or shallow  · Your speech is slurred, or you are confused  · You are dizzy or stumble when you walk  When should I or someone close to me call my doctor? · You are extremely drowsy, or you have trouble staying awake or speaking  · You have pale or clammy skin  · You have blue fingernails or lips  · Your heartbeat is slower than normal     · You cannot stop vomiting  · You have questions or concerns about your condition or care  CARE AGREEMENT:   You have the right to help plan your care  Learn about your health condition and how it may be treated  Discuss treatment options with your healthcare providers to decide what care you want to receive  You always have the right to refuse treatment  The above information is an  only  It is not intended as medical advice for individual conditions or treatments  Talk to your doctor, nurse or pharmacist before following any medical regimen to see if it is safe and effective for you  © Copyright [address] [place] [address] Drive Information is for End User's use only and may not be sold, redistributed or otherwise used for commercial purposes   All illustrations and images included in CareNotes® are the copyrighted property of [employer]  or [address] [place] [address] [place]
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
## 2021-04-13 ENCOUNTER — TELEMEDICINE (OUTPATIENT)
Dept: NEUROSURGERY | Facility: CLINIC | Age: 66
End: 2021-04-13
Payer: MEDICARE

## 2021-04-13 DIAGNOSIS — S32.030A CLOSED COMPRESSION FRACTURE OF L3 LUMBAR VERTEBRA, INITIAL ENCOUNTER (HCC): ICD-10-CM

## 2021-04-13 PROCEDURE — 99204 OFFICE O/P NEW MOD 45 MIN: CPT | Performed by: RADIOLOGY
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
## 2021-04-21 ENCOUNTER — HOSPITAL ENCOUNTER (OUTPATIENT)
Dept: MRI IMAGING | Facility: HOSPITAL | Age: 66
Discharge: HOME | End: 2021-04-21
Attending: RADIOLOGY
Payer: MEDICARE

## 2021-04-21 DIAGNOSIS — S32.030A CLOSED COMPRESSION FRACTURE OF L3 LUMBAR VERTEBRA, INITIAL ENCOUNTER (HCC): ICD-10-CM

## 2021-04-21 PROCEDURE — G1004 CDSM NDSC: HCPCS

## 2021-04-21 PROCEDURE — 72148 MRI LUMBAR SPINE W/O DYE: CPT

## 2021-04-23 ENCOUNTER — TELEMEDICINE (OUTPATIENT)
Dept: NEUROSURGERY | Facility: CLINIC | Age: 66
End: 2021-04-23
Payer: MEDICARE

## 2021-04-23 DIAGNOSIS — S32.030A CLOSED COMPRESSION FRACTURE OF L3 LUMBAR VERTEBRA, INITIAL ENCOUNTER (HCC): Primary | ICD-10-CM

## 2021-04-23 PROCEDURE — 99215 OFFICE O/P EST HI 40 MIN: CPT | Performed by: RADIOLOGY

## 2021-04-23 RX ORDER — CEFAZOLIN SODIUM 2 G/50ML
2000 SOLUTION INTRAVENOUS ONCE
Status: CANCELLED | OUTPATIENT
Start: 2021-04-23 | End: 2021-04-23
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
## 2021-05-20 ENCOUNTER — TELEPHONE (OUTPATIENT)
Dept: NEUROSURGERY | Facility: CLINIC | Age: 66
End: 2021-05-20
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
## 2021-07-20 ENCOUNTER — HOSPITAL ENCOUNTER (INPATIENT)
Facility: HOSPITAL | Age: 66
LOS: 1 days | Discharge: HOME | DRG: 310 | End: 2021-07-21
Attending: EMERGENCY MEDICINE | Admitting: STUDENT IN AN ORGANIZED HEALTH CARE EDUCATION/TRAINING PROGRAM
Payer: MEDICARE

## 2021-07-20 ENCOUNTER — APPOINTMENT (INPATIENT)
Dept: NON INVASIVE DIAGNOSTICS | Facility: HOSPITAL | Age: 66
DRG: 310 | End: 2021-07-20
Payer: MEDICARE

## 2021-07-20 ENCOUNTER — APPOINTMENT (EMERGENCY)
Dept: RADIOLOGY | Facility: HOSPITAL | Age: 66
DRG: 310 | End: 2021-07-20
Payer: MEDICARE

## 2021-07-20 DIAGNOSIS — Z86.73 HISTORY OF CVA (CEREBROVASCULAR ACCIDENT): ICD-10-CM

## 2021-07-20 DIAGNOSIS — I48.91 ATRIAL FIBRILLATION (HCC): ICD-10-CM

## 2021-07-20 DIAGNOSIS — I48.91 NEW ONSET ATRIAL FIBRILLATION (HCC): ICD-10-CM

## 2021-07-20 DIAGNOSIS — R07.9 CHEST PAIN: Primary | ICD-10-CM

## 2021-07-20 LAB
ALBUMIN SERPL BCP-MCNC: 3.9 G/DL (ref 3.5–5)
ALP SERPL-CCNC: 84 U/L (ref 46–116)
ALT SERPL W P-5'-P-CCNC: 42 U/L (ref 12–78)
ANION GAP SERPL CALCULATED.3IONS-SCNC: 11 MMOL/L (ref 4–13)
AST SERPL W P-5'-P-CCNC: 24 U/L (ref 5–45)
BASOPHILS # BLD AUTO: 0.06 THOUSANDS/ΜL (ref 0–0.1)
BASOPHILS NFR BLD AUTO: 1 % (ref 0–1)
BILIRUB SERPL-MCNC: 0.49 MG/DL (ref 0.2–1)
BUN SERPL-MCNC: 22 MG/DL (ref 5–25)
CALCIUM SERPL-MCNC: 9.1 MG/DL (ref 8.3–10.1)
CHLORIDE SERPL-SCNC: 107 MMOL/L (ref 100–108)
CO2 SERPL-SCNC: 23 MMOL/L (ref 21–32)
CREAT SERPL-MCNC: 0.97 MG/DL (ref 0.6–1.3)
EOSINOPHIL # BLD AUTO: 0.07 THOUSAND/ΜL (ref 0–0.61)
EOSINOPHIL NFR BLD AUTO: 1 % (ref 0–6)
ERYTHROCYTE [DISTWIDTH] IN BLOOD BY AUTOMATED COUNT: 12.2 % (ref 11.6–15.1)
GFR SERPL CREATININE-BSD FRML MDRD: 82 ML/MIN/1.73SQ M
GLUCOSE SERPL-MCNC: 127 MG/DL (ref 65–140)
HCT VFR BLD AUTO: 46.5 % (ref 36.5–49.3)
HGB BLD-MCNC: 15.9 G/DL (ref 12–17)
IMM GRANULOCYTES # BLD AUTO: 0.01 THOUSAND/UL (ref 0–0.2)
IMM GRANULOCYTES NFR BLD AUTO: 0 % (ref 0–2)
LYMPHOCYTES # BLD AUTO: 1.4 THOUSANDS/ΜL (ref 0.6–4.47)
LYMPHOCYTES NFR BLD AUTO: 21 % (ref 14–44)
MCH RBC QN AUTO: 31.7 PG (ref 26.8–34.3)
MCHC RBC AUTO-ENTMCNC: 34.2 G/DL (ref 31.4–37.4)
MCV RBC AUTO: 93 FL (ref 82–98)
MONOCYTES # BLD AUTO: 0.59 THOUSAND/ΜL (ref 0.17–1.22)
MONOCYTES NFR BLD AUTO: 9 % (ref 4–12)
NEUTROPHILS # BLD AUTO: 4.42 THOUSANDS/ΜL (ref 1.85–7.62)
NEUTS SEG NFR BLD AUTO: 68 % (ref 43–75)
NRBC BLD AUTO-RTO: 0 /100 WBCS
PLATELET # BLD AUTO: 215 THOUSANDS/UL (ref 149–390)
PMV BLD AUTO: 9.2 FL (ref 8.9–12.7)
POTASSIUM SERPL-SCNC: 3.9 MMOL/L (ref 3.5–5.3)
PROT SERPL-MCNC: 8.1 G/DL (ref 6.4–8.2)
RBC # BLD AUTO: 5.01 MILLION/UL (ref 3.88–5.62)
SODIUM SERPL-SCNC: 141 MMOL/L (ref 136–145)
TROPONIN I SERPL-MCNC: <0.02 NG/ML
WBC # BLD AUTO: 6.55 THOUSAND/UL (ref 4.31–10.16)

## 2021-07-20 PROCEDURE — 80053 COMPREHEN METABOLIC PANEL: CPT | Performed by: EMERGENCY MEDICINE

## 2021-07-20 PROCEDURE — 85025 COMPLETE CBC W/AUTO DIFF WBC: CPT | Performed by: EMERGENCY MEDICINE

## 2021-07-20 PROCEDURE — 84484 ASSAY OF TROPONIN QUANT: CPT | Performed by: STUDENT IN AN ORGANIZED HEALTH CARE EDUCATION/TRAINING PROGRAM

## 2021-07-20 PROCEDURE — 71045 X-RAY EXAM CHEST 1 VIEW: CPT

## 2021-07-20 PROCEDURE — 93306 TTE W/DOPPLER COMPLETE: CPT | Performed by: INTERNAL MEDICINE

## 2021-07-20 PROCEDURE — 93005 ELECTROCARDIOGRAM TRACING: CPT

## 2021-07-20 PROCEDURE — 83036 HEMOGLOBIN GLYCOSYLATED A1C: CPT | Performed by: STUDENT IN AN ORGANIZED HEALTH CARE EDUCATION/TRAINING PROGRAM

## 2021-07-20 PROCEDURE — 96374 THER/PROPH/DIAG INJ IV PUSH: CPT

## 2021-07-20 PROCEDURE — 99223 1ST HOSP IP/OBS HIGH 75: CPT | Performed by: STUDENT IN AN ORGANIZED HEALTH CARE EDUCATION/TRAINING PROGRAM

## 2021-07-20 PROCEDURE — 99285 EMERGENCY DEPT VISIT HI MDM: CPT

## 2021-07-20 PROCEDURE — 93306 TTE W/DOPPLER COMPLETE: CPT

## 2021-07-20 PROCEDURE — 1124F ACP DISCUSS-NO DSCNMKR DOCD: CPT | Performed by: EMERGENCY MEDICINE

## 2021-07-20 PROCEDURE — 96375 TX/PRO/DX INJ NEW DRUG ADDON: CPT

## 2021-07-20 PROCEDURE — 36415 COLL VENOUS BLD VENIPUNCTURE: CPT | Performed by: EMERGENCY MEDICINE

## 2021-07-20 PROCEDURE — 99285 EMERGENCY DEPT VISIT HI MDM: CPT | Performed by: EMERGENCY MEDICINE

## 2021-07-20 PROCEDURE — 84484 ASSAY OF TROPONIN QUANT: CPT | Performed by: EMERGENCY MEDICINE

## 2021-07-20 RX ORDER — CELECOXIB 100 MG/1
100 CAPSULE ORAL
COMMUNITY
Start: 2021-04-02 | End: 2021-07-20 | Stop reason: SDUPTHER

## 2021-07-20 RX ORDER — FLUTICASONE PROPIONATE 220 UG/1
2 AEROSOL, METERED RESPIRATORY (INHALATION) EVERY 12 HOURS SCHEDULED
Status: DISCONTINUED | OUTPATIENT
Start: 2021-07-20 | End: 2021-07-21 | Stop reason: HOSPADM

## 2021-07-20 RX ORDER — DOXEPIN HYDROCHLORIDE 25 MG/1
100 CAPSULE ORAL
Status: DISCONTINUED | OUTPATIENT
Start: 2021-07-20 | End: 2021-07-21 | Stop reason: HOSPADM

## 2021-07-20 RX ORDER — NITROGLYCERIN 0.4 MG/1
0.4 TABLET SUBLINGUAL
Status: DISCONTINUED | OUTPATIENT
Start: 2021-07-20 | End: 2021-07-21 | Stop reason: HOSPADM

## 2021-07-20 RX ORDER — ACETAMINOPHEN 325 MG/1
650 TABLET ORAL EVERY 6 HOURS PRN
Status: DISCONTINUED | OUTPATIENT
Start: 2021-07-20 | End: 2021-07-21 | Stop reason: HOSPADM

## 2021-07-20 RX ORDER — ASPIRIN 325 MG
325 TABLET ORAL DAILY
Status: DISCONTINUED | OUTPATIENT
Start: 2021-07-20 | End: 2021-07-21

## 2021-07-20 RX ORDER — KETOROLAC TROMETHAMINE 30 MG/ML
15 INJECTION, SOLUTION INTRAMUSCULAR; INTRAVENOUS ONCE
Status: COMPLETED | OUTPATIENT
Start: 2021-07-20 | End: 2021-07-20

## 2021-07-20 RX ORDER — METOPROLOL TARTRATE 5 MG/5ML
5 INJECTION INTRAVENOUS ONCE
Status: COMPLETED | OUTPATIENT
Start: 2021-07-20 | End: 2021-07-20

## 2021-07-20 RX ORDER — HYDROCODONE BITARTRATE AND ACETAMINOPHEN 5; 325 MG/1; MG/1
1 TABLET ORAL EVERY 8 HOURS PRN
Status: DISCONTINUED | OUTPATIENT
Start: 2021-07-20 | End: 2021-07-21 | Stop reason: HOSPADM

## 2021-07-20 RX ORDER — CELECOXIB 100 MG/1
100 CAPSULE ORAL 2 TIMES DAILY
Status: DISCONTINUED | OUTPATIENT
Start: 2021-07-20 | End: 2021-07-20

## 2021-07-20 RX ORDER — ATORVASTATIN CALCIUM 40 MG/1
40 TABLET, FILM COATED ORAL
Status: DISCONTINUED | OUTPATIENT
Start: 2021-07-20 | End: 2021-07-21 | Stop reason: HOSPADM

## 2021-07-20 RX ORDER — SUMATRIPTAN 50 MG/1
50 TABLET, FILM COATED ORAL ONCE AS NEEDED
Status: DISCONTINUED | OUTPATIENT
Start: 2021-07-20 | End: 2021-07-20

## 2021-07-20 RX ORDER — METOPROLOL TARTRATE 5 MG/5ML
5 INJECTION INTRAVENOUS EVERY 6 HOURS PRN
Status: DISCONTINUED | OUTPATIENT
Start: 2021-07-20 | End: 2021-07-21 | Stop reason: HOSPADM

## 2021-07-20 RX ORDER — ACETAMINOPHEN 325 MG/1
650 TABLET ORAL EVERY 6 HOURS PRN
Status: DISCONTINUED | OUTPATIENT
Start: 2021-07-20 | End: 2021-07-20

## 2021-07-20 RX ORDER — LORAZEPAM 1 MG/1
1 TABLET ORAL ONCE
Status: COMPLETED | OUTPATIENT
Start: 2021-07-20 | End: 2021-07-20

## 2021-07-20 RX ORDER — TAMSULOSIN HYDROCHLORIDE 0.4 MG/1
0.4 CAPSULE ORAL
Status: DISCONTINUED | OUTPATIENT
Start: 2021-07-20 | End: 2021-07-21 | Stop reason: HOSPADM

## 2021-07-20 RX ORDER — CYCLOBENZAPRINE HCL 10 MG
10 TABLET ORAL 2 TIMES DAILY PRN
Status: DISCONTINUED | OUTPATIENT
Start: 2021-07-20 | End: 2021-07-21 | Stop reason: HOSPADM

## 2021-07-20 RX ORDER — BACLOFEN 10 MG/1
20 TABLET ORAL 3 TIMES DAILY PRN
Status: DISCONTINUED | OUTPATIENT
Start: 2021-07-20 | End: 2021-07-21 | Stop reason: HOSPADM

## 2021-07-20 RX ADMIN — NITROGLYCERIN 0.4 MG: 0.4 TABLET SUBLINGUAL at 20:27

## 2021-07-20 RX ADMIN — ATORVASTATIN CALCIUM 40 MG: 40 TABLET, FILM COATED ORAL at 16:20

## 2021-07-20 RX ADMIN — ACETAMINOPHEN 650 MG: 325 TABLET ORAL at 20:27

## 2021-07-20 RX ADMIN — APIXABAN 5 MG: 5 TABLET, FILM COATED ORAL at 18:29

## 2021-07-20 RX ADMIN — METOROPROLOL TARTRATE 5 MG: 5 INJECTION, SOLUTION INTRAVENOUS at 07:41

## 2021-07-20 RX ADMIN — NITROGLYCERIN 0.4 MG: 0.4 TABLET SUBLINGUAL at 17:06

## 2021-07-20 RX ADMIN — FLUTICASONE PROPIONATE 2 PUFF: 220 AEROSOL, METERED RESPIRATORY (INHALATION) at 09:55

## 2021-07-20 RX ADMIN — LORAZEPAM 1 MG: 1 TABLET ORAL at 21:59

## 2021-07-20 RX ADMIN — KETOROLAC TROMETHAMINE 15 MG: 30 INJECTION, SOLUTION INTRAMUSCULAR at 07:38

## 2021-07-20 RX ADMIN — METOPROLOL TARTRATE 5 MG: 1 INJECTION, SOLUTION INTRAVENOUS at 21:59

## 2021-07-20 RX ADMIN — NITROGLYCERIN 0.4 MG: 0.4 TABLET SUBLINGUAL at 20:08

## 2021-07-20 RX ADMIN — TAMSULOSIN HYDROCHLORIDE 0.4 MG: 0.4 CAPSULE ORAL at 16:20

## 2021-07-20 RX ADMIN — DOXEPIN HYDROCHLORIDE 100 MG: 25 CAPSULE ORAL at 21:59

## 2021-07-20 RX ADMIN — FLUTICASONE PROPIONATE 2 PUFF: 220 AEROSOL, METERED RESPIRATORY (INHALATION) at 20:31

## 2021-07-20 RX ADMIN — ASPIRIN 325 MG ORAL TABLET 325 MG: 325 PILL ORAL at 09:52

## 2021-07-20 RX ADMIN — NITROGLYCERIN 0.4 MG: 0.4 TABLET SUBLINGUAL at 20:17

## 2021-07-20 RX ADMIN — ACETAMINOPHEN 650 MG: 325 TABLET ORAL at 17:43

## 2021-07-20 RX ADMIN — METOROPROLOL TARTRATE 5 MG: 5 INJECTION, SOLUTION INTRAVENOUS at 08:59

## 2021-07-20 NOTE — ASSESSMENT & PLAN NOTE
Patient had a history of 2 strokes in the past 2 years  Unclear whether this was related to afib as patient does not recall ever being diagnosed with afib however did have a holter monitor placed 4 years ago   Was never started on anticoagulation

## 2021-07-20 NOTE — UTILIZATION REVIEW
Inpatient Admission Authorization Request   NOTIFICATION OF INPATIENT ADMISSION/INPATIENT AUTHORIZATION REQUEST   SERVICING FACILITY:   [address] [place] [address]  Zoya Stephen [address] [place] [address], [address]  Tax ID: [id number]  NPI: [id number]  Place of Service: Inpatient [facility]  [address]  Place of Service Code: [address]      ATTENDING PROVIDER:  Attending Name and NPI#: Nando Tinoco [[id number]]  Address: Zoya Stephen [address] [place] [address], [address]  Phone: [phone]     UTILIZATION REVIEW CONTACT:  Jason Aldana Utilization   Network Utilization Review Department  Phone: [phone]  Fax [phone]  Email: [email or web address]     PHYSICIAN ADVISORY SERVICES:  FOR [id number] REVIEW - MEDICAL NECESSITY DENIAL  Phone: [phone]  Fax: [phone]  Email: [email or web address]     TYPE OF REQUEST:  Inpatient Status     ADMISSION INFORMATION:  ADMISSION DATE/TIME: 7/20/21  8:40 AM  PATIENT DIAGNOSIS CODE/DESCRIPTION:  Chest pain [R07 9]  DISCHARGE DATE/TIME: No discharge date for patient encounter  DISCHARGE DISPOSITION (IF DISCHARGED): Home/Self Care     IMPORTANT INFORMATION:  Please contact the Jason Aldana directly with any questions or concerns regarding this request  Department voicemails are confidential     Send requests for admission clinical reviews, concurrent reviews, approvals, and administrative denials due to lack of clinical to fax [phone]

## 2021-07-20 NOTE — ASSESSMENT & PLAN NOTE
Patient presented with afib in the 140s with no known history of afib  Not on any anticoagulation  The patient has been on verapamil and is currently in the 90-100s at this time  Discussed anticoagulation with patient and he is agreeable  Started on Eliquis on 7/20

## 2021-07-20 NOTE — NURSING NOTE
Pt admitted to [address] [place] [address], blue  phone used to complete nursing admission,  ID # [id number]; security tag # [id number] accompanied patient up from ER, placed in chart  Call bell within reach

## 2021-07-20 NOTE — ASSESSMENT & PLAN NOTE
Started having chest pain at home and felt like he was going to die  Telemetry monitoring  Trend troponins: initial negative  Serial EKGs  TTE results pending  Initial EKG showing afib  Cardiology consult placed, recommendations appreciated  ANTOLIN 3  Patient refusing stress test at this time given bad experience in the past  Will rediscuss with him tomorrow pending Cardiology recommendations
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
## 2021-07-21 VITALS
DIASTOLIC BLOOD PRESSURE: 82 MMHG | HEIGHT: 70 IN | RESPIRATION RATE: 18 BRPM | WEIGHT: 201.2 LBS | SYSTOLIC BLOOD PRESSURE: 110 MMHG | HEART RATE: 102 BPM | OXYGEN SATURATION: 96 % | TEMPERATURE: 98.2 F | BODY MASS INDEX: 28.8 KG/M2

## 2021-07-21 LAB
ANION GAP SERPL CALCULATED.3IONS-SCNC: 11 MMOL/L (ref 4–13)
BASOPHILS # BLD AUTO: 0.04 THOUSANDS/ΜL (ref 0–0.1)
BASOPHILS NFR BLD AUTO: 1 % (ref 0–1)
BUN SERPL-MCNC: 22 MG/DL (ref 5–25)
CALCIUM SERPL-MCNC: 9 MG/DL (ref 8.3–10.1)
CHLORIDE SERPL-SCNC: 108 MMOL/L (ref 100–108)
CHOLEST SERPL-MCNC: 194 MG/DL (ref 50–200)
CO2 SERPL-SCNC: 21 MMOL/L (ref 21–32)
CREAT SERPL-MCNC: 0.73 MG/DL (ref 0.6–1.3)
EOSINOPHIL # BLD AUTO: 0.09 THOUSAND/ΜL (ref 0–0.61)
EOSINOPHIL NFR BLD AUTO: 1 % (ref 0–6)
ERYTHROCYTE [DISTWIDTH] IN BLOOD BY AUTOMATED COUNT: 12.2 % (ref 11.6–15.1)
EST. AVERAGE GLUCOSE BLD GHB EST-MCNC: 103 MG/DL
GFR SERPL CREATININE-BSD FRML MDRD: 97 ML/MIN/1.73SQ M
GLUCOSE SERPL-MCNC: 112 MG/DL (ref 65–140)
HBA1C MFR BLD: 5.2 %
HCT VFR BLD AUTO: 44.4 % (ref 36.5–49.3)
HDLC SERPL-MCNC: 36 MG/DL
HGB BLD-MCNC: 14.7 G/DL (ref 12–17)
IMM GRANULOCYTES # BLD AUTO: 0.01 THOUSAND/UL (ref 0–0.2)
IMM GRANULOCYTES NFR BLD AUTO: 0 % (ref 0–2)
LDLC SERPL CALC-MCNC: 139 MG/DL (ref 0–100)
LYMPHOCYTES # BLD AUTO: 1.42 THOUSANDS/ΜL (ref 0.6–4.47)
LYMPHOCYTES NFR BLD AUTO: 19 % (ref 14–44)
MAGNESIUM SERPL-MCNC: 2 MG/DL (ref 1.6–2.6)
MCH RBC QN AUTO: 31.3 PG (ref 26.8–34.3)
MCHC RBC AUTO-ENTMCNC: 33.1 G/DL (ref 31.4–37.4)
MCV RBC AUTO: 95 FL (ref 82–98)
MONOCYTES # BLD AUTO: 0.71 THOUSAND/ΜL (ref 0.17–1.22)
MONOCYTES NFR BLD AUTO: 10 % (ref 4–12)
NEUTROPHILS # BLD AUTO: 5.19 THOUSANDS/ΜL (ref 1.85–7.62)
NEUTS SEG NFR BLD AUTO: 69 % (ref 43–75)
NRBC BLD AUTO-RTO: 0 /100 WBCS
PLATELET # BLD AUTO: 210 THOUSANDS/UL (ref 149–390)
PMV BLD AUTO: 9.5 FL (ref 8.9–12.7)
POTASSIUM SERPL-SCNC: 4.2 MMOL/L (ref 3.5–5.3)
RBC # BLD AUTO: 4.7 MILLION/UL (ref 3.88–5.62)
SODIUM SERPL-SCNC: 140 MMOL/L (ref 136–145)
TRIGL SERPL-MCNC: 96 MG/DL
WBC # BLD AUTO: 7.46 THOUSAND/UL (ref 4.31–10.16)

## 2021-07-21 PROCEDURE — 80048 BASIC METABOLIC PNL TOTAL CA: CPT | Performed by: STUDENT IN AN ORGANIZED HEALTH CARE EDUCATION/TRAINING PROGRAM

## 2021-07-21 PROCEDURE — 80061 LIPID PANEL: CPT | Performed by: STUDENT IN AN ORGANIZED HEALTH CARE EDUCATION/TRAINING PROGRAM

## 2021-07-21 PROCEDURE — 83735 ASSAY OF MAGNESIUM: CPT | Performed by: STUDENT IN AN ORGANIZED HEALTH CARE EDUCATION/TRAINING PROGRAM

## 2021-07-21 PROCEDURE — 99239 HOSP IP/OBS DSCHRG MGMT >30: CPT | Performed by: STUDENT IN AN ORGANIZED HEALTH CARE EDUCATION/TRAINING PROGRAM

## 2021-07-21 PROCEDURE — 85025 COMPLETE CBC W/AUTO DIFF WBC: CPT | Performed by: STUDENT IN AN ORGANIZED HEALTH CARE EDUCATION/TRAINING PROGRAM

## 2021-07-21 PROCEDURE — 99222 1ST HOSP IP/OBS MODERATE 55: CPT | Performed by: INTERNAL MEDICINE

## 2021-07-21 RX ORDER — METOPROLOL TARTRATE 5 MG/5ML
5 INJECTION INTRAVENOUS ONCE
Status: COMPLETED | OUTPATIENT
Start: 2021-07-21 | End: 2021-07-21

## 2021-07-21 RX ORDER — METOPROLOL TARTRATE 50 MG/1
50 TABLET, FILM COATED ORAL EVERY 12 HOURS SCHEDULED
Status: DISCONTINUED | OUTPATIENT
Start: 2021-07-21 | End: 2021-07-21 | Stop reason: HOSPADM

## 2021-07-21 RX ORDER — METOPROLOL TARTRATE 50 MG/1
50 TABLET, FILM COATED ORAL EVERY 12 HOURS SCHEDULED
Qty: 60 TABLET | Refills: 0 | Status: SHIPPED | OUTPATIENT
Start: 2021-07-21 | End: 2021-08-05 | Stop reason: SDUPTHER

## 2021-07-21 RX ORDER — ASPIRIN 81 MG/1
81 TABLET ORAL DAILY
Qty: 30 TABLET | Refills: 0 | Status: SHIPPED | OUTPATIENT
Start: 2021-07-21 | End: 2021-08-05 | Stop reason: SDUPTHER

## 2021-07-21 RX ORDER — ASPIRIN 81 MG/1
81 TABLET ORAL DAILY
Status: DISCONTINUED | OUTPATIENT
Start: 2021-07-22 | End: 2021-07-21 | Stop reason: HOSPADM

## 2021-07-21 RX ADMIN — FLUTICASONE PROPIONATE 2 PUFF: 220 AEROSOL, METERED RESPIRATORY (INHALATION) at 09:00

## 2021-07-21 RX ADMIN — ASPIRIN 325 MG ORAL TABLET 325 MG: 325 PILL ORAL at 08:32

## 2021-07-21 RX ADMIN — METOPROLOL TARTRATE 25 MG: 25 TABLET, FILM COATED ORAL at 11:10

## 2021-07-21 RX ADMIN — METOPROLOL TARTRATE 5 MG: 1 INJECTION, SOLUTION INTRAVENOUS at 00:55

## 2021-07-21 RX ADMIN — METOPROLOL TARTRATE 5 MG: 1 INJECTION, SOLUTION INTRAVENOUS at 05:27

## 2021-07-21 RX ADMIN — METOPROLOL TARTRATE 25 MG: 25 TABLET, FILM COATED ORAL at 08:32

## 2021-07-21 RX ADMIN — APIXABAN 5 MG: 5 TABLET, FILM COATED ORAL at 08:32

## 2021-07-21 NOTE — DISCHARGE INSTRUCTIONS
Thank you for allowing us to participate in the care of your patient    ???????? ?????????? (A-fib, Atrial Fibrillation)   ???????????? ??????:   ???????? ?????????? (??) ???????? ???????? ????????????? ????????????  ??? ??????? ??????????? ?????? ???????????? ????? ?? ????? ????  ???????? ?????????? ????? ??????????? ???????????? ? ????? ???????? ???????????????, ??? ????? ????? ??????????? ????????????  ???????? ?????????? ????? ??????? ?????? ????? ??-?? ??????????? ???????? ?????, ???????? ??? ????????? ???????????????  ????? ?????? ???????? ?????????? ? ??????? ?? ?????????? ????????, ????? ????????????? ???????? ???? ???????????      ????? ???????? ? ???????? ???????? ?????????:  · ????????, ???????? ??? ?????????? ????????????    · ????????, ??????? ???????????? ??? ??????????? ????????    · ?????????????? ?????????, ??????????, ?????????????? ??? ???????    · ?????? ??? ??????? ???????    · ???? ??? ???????? ? ?????    ????????? ? ?????? ?????????? ?????? (911 ? ???) ??? ????????? ????-?????? ?????????, ????:  · ? ??? ???? ????? ?? ????????????? ????????? ?????????? ????????:      ? ??????, ??????????? ??? ???? ? ??????? ??????    ? ? ??? ?????  ????? ??????????? ?????????:     § ?????????? ??? ???? ? ?????, ???, ???????, ??????? ??? ? ????    § ??????    § ??????? ??? ?????    § ??????? ??????? ??? ????????? ???????? ???    · ? ??? ??????????? ????? ?? ????????????? ????????? ?????????? ????????:      ? ???????? ??? ????????? ????? ???????? ????    ? ???????? ? ???? ??? ????    ? ??????????? ???????? ??? ??????????? ????    ? ??????????????, ??????? ???????? ???? ??? ?????? ??????     ?????????? ? ????? ??? ??????????, ????:  · ?? ???????? ?????, ?????????? ???????????????? ? ???? ? ????? ? ?????  ??? ????? ???? ???????? ? ?????????????     · ???? ?????? ?????? ? ???????? ????? 110 ?????? ? ??????     · ????????? ??? ????????? ????????? ???, ???????, ??????? ? ??????? ???????     · ? ??? ?????? ???? ? ????????? ?????     · ? ??? ???? ??????? ??? ???????? ???????????? ?????? ????????? ??? ???????     ??????? ???????? ??????????: ??????? ????? ?????????? ?? ???????????, ?????????? ???????? ??????????, ????????, ??????????? ?????????? ??????  ??? ????? ???????????? ?????????:  · ???????????????? ????????????? ????????? ???????? ?????????????? ??????? ???????????? ??? ????????? ????  ??? ??????? ????????? ??? ????? ???? ?????????? ??????? ???????? ?????? ?????? ????     · ?????????????????? ??? ?????????????????? ???????? ???????? ????????????? ??????? ????? ? ???????     · ???????????? - ??? ????????? ?? ???????? ??????? ????????? ?????????? ? ????? ? ?????  ??? ????? ???? ???????????? ??? ?????? ???????? ??? ???????????     · ??????? ???????? ?????????? - ??? ?????????, ??? ??????? ???????????? ??????? ??? ?????????? ?????????? ??????? ????? ??????  ??? ??????? ???????? ????? ? ????????????? ????????????? ???????, ??????? ???????? ???????? ??????????  ????????, ??? ????????? ??? ???????? ?????? ????????????  ?????????? ?? ?????????????? ??????????? ???????????? ??????? ???????? ??????????     · ???????????????? ????? ???? ?????????? ?? ??????  ???????????????? - ??? ??????????, ??????? ???????????? ?? ???? ??????  ???????????????? ????? ???? ??????? ?? ????? ????????? ??????? ??? ????????????? ????????  ????????? ?????????? ? ????????????????? ?? ?????? ???????? ? ?????? ?????          · ???????? ????? ???????????? ? ????????, ???? ?????? ?????? ??????? ?? ?????????  ??? ?????????? ????????????? ???????? ??? ???? ????? ?????? ??????? ? ??????? ????? ??????  ???? ????? ??????? ???????, ????? ??????? ???????? ?????  ???????? ????? ????? ????????????? ????????????? ???????, ??????? ???????? ???????? ??????????     ??????? ???????? ??????????:  · ?????? ???? ??????? ??????? ????????????  ???????, ??? ???????? ???? ????? ? ??????????? ??????? ????????????          · ??????????? ?????, ???? ?? ?????? ???? ????????  ???????? ????? ????????? ???? ???????? ?????????? ??? ????????? ??? ???????  ??????? ? ?????? ???????? ?????, ????? ?? ??? ??????????? ????????  ?? ??? ??? ????? ?????? ??? ?????????? ??????????? ?? ?????????? ????????, ??????? ?? ?????? ?????? ?? 24 ???? ? ? ??????  ???? ??????????? ??????? ????????????? 12 ?????? ????, 5 ?????? ???? ??? 1,5 ?????? ???????? ????????? ???????     · ?? ??????  ??????? ????? ???????? ??????????? ?????? ? ????????? ??????? ???????? ??????????  ?? ??????????? ??????????? ???????? ? ????????????? ????? ?????? ??????? ??? ? ????? ?????? ?? ???????  ??? ??-???????? ???????? ???????  ??????? ? ?????? ???????? ?????, ??? ??? ??????, ???? ?? ?????? ?????? ? ?????? ?? ???????     · ???????????? ???????? ???????, ???????? ??? ???????? ??????  ???????? ???????? ??????? ??????? ??? ???????????? ?????? ??????? ???????????  ? ???????? ????????? ????????? ??????, ?????, ??????? ??????? ?? ???????? ?????, ???????? ???????? ? ?????? ??????????? ????, ???????, ???????? ???? ? ????  ???????? ????? ? ???????? ?? ???????? ??? ???????? ?????? ???????????? ?????, ????? ??? ????????? ? ?????????          · ????????????? ??????????? ???  ??????? ? ?????? ????? ???? ??????????? ???  ???? ? ??? ???????? ???, ??????????????????? ? ?????? ???????????? ?????????? ????????? ???????? ????  ???? ????? ????????????? ???? ???????? ???? ?? 10% ? ???? ???????? ????????? ?????? ??????     · ????????? ??????????? ?????????? ?????????  ?????????? ???????? ???????? ???????? ????????? ?????? ??????  ???????? ??? ??????? 150 ????? ????????? ???????? ?????????? ?????????? ?????? ??????  ??? ???? ????? ?????? ??? ????????? ???? ?????????? ??????????          · ??????? ??? ????????? ?????? ??????? ????????? ????????  ? ??? ????????? ??????? ???????? ????????, ??????? ???????????, ????? ?? ???, ??????, ? ????? ?????? ??????????? ??????  ?????????? ????????????? ???????? ???????? ?????????? ? ???????? ?????? ????? ???????  ??? ???? ????? ???????? ?????????, ??????? ?? ??????????, ???? ??? ???????? ???????? ??????????  ??  ??????????? ????????? ???? ????????????? ???????? ??? ?????????? ?????? ?????     ????????? ?????? ????? ??? ?????????? ? ???????????? ? ??????????: ??? ??????????? ?????? ?????????? ??????? ????? ? ??????????????? (????????????? ????????)  ???????? ???? ???????, ????? ?? ?????? ?????? ?? ?? ????? ?????????   © Copyright [employer] 2021 Information is for End User's use only and may not be sold, redistributed or otherwise used for commercial purposes  All illustrations and images included in CareNotes® are the copyrighted property of [employer]  or [employer]   The above information is an  only  It is not intended as medical advice for individual conditions or treatments  Talk to your doctor, nurse or pharmacist before following any medical regimen to see if it is safe and effective for you

## 2021-07-21 NOTE — ASSESSMENT & PLAN NOTE
Pt presented with left sided, pressure pain, that he states is worse with deep breathing, palpitation, and movement of his left arm  There are inconsistencies to his chest pain story, which may be partially due to language barrier  Troponin 1 levels x 4 <0 02  ECG initially a fib with RVR  Pt actually spontaneously converted to sinus rhythm during cardiology evaluation  Pt initially reported some relief with nitroglycerin, however he received 3 doses of nitroglycerin overnight that did not provide relief  At the time of my assessment he reports mild tenderness to his left pectoral muscle that is significantly improved from yesterday  He declines stress testing at this time  Recommend continue telemetry during admission and monitor overnight tonight  Plan for close outpatient cardiology follow up with stress testing in the outpatient setting  Continue rate control, blood pressure control  Continue Aspirin at 81mg daily dose  Continue statin and beta blocker
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
## 2021-07-21 NOTE — PLAN OF CARE
Problem: CARDIOVASCULAR - ADULT  Goal: Maintains optimal cardiac output and hemodynamic stability  Description: INTERVENTIONS:  - Monitor I/O, vital signs and rhythm  - Monitor for S/S and trends of decreased cardiac output  - Administer and titrate ordered vasoactive medications to optimize hemodynamic stability  - Assess quality of pulses, skin color and temperature  - Assess for signs of decreased coronary artery perfusion  - Instruct patient to report change in severity of symptoms  Outcome: Progressing     Problem: RESPIRATORY - ADULT  Goal: Achieves optimal ventilation and oxygenation  Description: INTERVENTIONS:  - Assess for changes in respiratory status  - Assess for changes in mentation and behavior  - Position to facilitate oxygenation and minimize respiratory effort  - Oxygen administered by appropriate delivery if ordered  - Initiate smoking cessation education as indicated  - Encourage broncho-pulmonary hygiene including cough, deep breathe, Incentive Spirometry  - Assess the need for suctioning and aspirate as needed  - Assess and instruct to report SOB or any respiratory difficulty  - Respiratory Therapy support as indicated  Outcome: Progressing     Problem: METABOLIC, FLUID AND ELECTROLYTES - ADULT  Goal: Electrolytes maintained within normal limits  Description: INTERVENTIONS:  - Monitor labs and assess patient for signs and symptoms of electrolyte imbalances  - Administer electrolyte replacement as ordered  - Monitor response to electrolyte replacements, including repeat lab results as appropriate  - Instruct patient on fluid and nutrition as appropriate  Outcome: Progressing

## 2021-07-21 NOTE — ASSESSMENT & PLAN NOTE
Patient had a history of 2 strokes in the past 2 years  Unclear whether this was related to afib as patient does not recall ever being diagnosed with afib however did have a holter monitor placed 4 years ago   Was never started on anticoagulation  Started patient on Eliquis and decreased ASA dose to 81 mg daily

## 2021-07-21 NOTE — ASSESSMENT & PLAN NOTE
Started having chest pain at home and felt like he was going to die  Telemetry monitoring: showing afib however spontaneously converted to sinus rhythm during this admission  Trend troponins: negative  Serial EKGs: atrial fibrillation  TTE reviewed  EKG showing afib  Cardiology consult placed, recommendations appreciated  ANTOLIN 3  Patient refusing stress test at this time given bad experience in the past and continues to refuse even after discussion with Cardiology  Will follow-up with Cardiology in clinic  Will maintain patient on ASA 81 mg, Eliquis and metoprolol tartrate 50 mg q12h for atrial fibrillation  Has been asymptomatic this morning

## 2021-07-21 NOTE — NURSING NOTE
Patient discharged home today; IV removed intact, no bleeding noted; AVS printed and reviewed with patient using  services ID # [id number], patient verbalizes understanding  Patient ambulates to exit accompanied by PCA

## 2021-07-21 NOTE — ASSESSMENT & PLAN NOTE
Patient presented with afib in the 140s with no known history of afib  Not on any anticoagulation  The patient has been on verapamil and is currently in the 90-100s at this time  Discussed anticoagulation with patient and he is agreeable  Started on Eliquis on 7/20  Switched patient from verapamil to metoprolol tartrate 50 mg q12h   Spontaneously converted to NSR  Patient to follow-up with Cardiology as outpatient

## 2021-07-21 NOTE — UTILIZATION REVIEW
Initial Clinical Review    Admission: Date/Time/Statement:   Admission Orders (From admission, onward)     Ordered        07/20/21 0840  Inpatient Admission  Once                   Orders Placed This Encounter   Procedures    Inpatient Admission     Standing Status:   Standing     Number of Occurrences:   1     Order Specific Question:   Level of Care     Answer:   Med Surg [16]     Order Specific Question:   Estimated length of stay     Answer:   More than 2 Midnights     Order Specific Question:   Certification     Answer:   I certify that inpatient services are medically necessary for this patient for a duration of greater than two midnights  See H&P and MD Progress Notes for additional information about the patient's course of treatment  ED Arrival Information     Expected Arrival Acuity    - 7/20/2021 06:57 Emergent         Means of arrival Escorted by Service Admission type    [facility] Emergency         Arrival complaint    chest pain        Chief Complaint   Patient presents with    Chest Pain     Pt woke around 0400 with pressure in left chest, pt hx of MI        Initial Presentation: 72  Y O male presents to ED from home with chest pain which started early the am of admission  Pain described as achy and heavy over his left chest, lasted a long time   + diaphoresis  "Felt like he was going to die "  Found  In  Afib  In ED, heart rate in the  140's, persistent chest pain  Had a similar incident  About  4 years ago, felt like his heart  Was going to jump out of his chest,  Saw practitoner and  Sent home with a holtor monitor  Never  Told he had an irregular heart rate at that time  ANTOLIN   3    PMH  Is  CVA  Admit  Ip with  Chest pain, Atrial fibrillation and plan is  Cardiology consult, monitor labs, refusing stress test, serial EKG's, start Eliquis, tele and trend troponin        Cardiology consult  ( 7/21)    Spontaneously  Converted to NSR during  Cardiology  Eval   CVA  History is questionable   Continue aspirin, statin and metoprolol  Needs close  OP cardiology  F/u       ED Triage Vitals   Temperature Pulse Respirations Blood Pressure SpO2   07/20/21 0706 07/20/21 0706 07/20/21 0706 07/20/21 0706 07/20/21 0706   (!) 97 2 °F (36 2 °C) (!) 141 20 116/71 98 %      Temp Source Heart Rate Source Patient Position - Orthostatic VS BP Location FiO2 (%)   07/20/21 0706 07/20/21 0706 07/20/21 0845 07/20/21 0845 --   Temporal Monitor Lying Right arm       Pain Score       07/20/21 0738       6          Wt Readings from Last 1 Encounters:   07/20/21 91 3 kg (201 lb 3 2 oz)     Additional Vital Signs:   21/21 0833  --  --  --  --  --  96 %  None (Room air)  --   07/21/21 0700  98 2 °F (36 8 °C)  102  18  110/82  94  97 %  --  --   07/21/21 05:26:36  --  103  --  108/86  93  97 %  --  --   07/21/21 05:14:24  97 3 °F (36 3 °C)Abnormal   108Abnormal   13  119/79  92  97 %  --  --   07/20/21 23:03:43  98 8 °F (37 1 °C)  79  17  118/80  93  95 %  --  --   07/20/21 2100  --  --  --  --  --  --  None (Room air)  --   07/20/21 20:30:14  --  --  --  120/81  94  --  --  --   07/20/21 20:13:14  --  100  --  141/88  106  98 %  --  --   07/20/21 20:04:41  --  111Abnormal   --  154/94  114  96 %  --  --   07/20/21 14:17:12  98 °F (36 7 °C)  105  18  118/80  93  97 %  --  --   07/20/21 1320  97 8 °F (36 6 °C)  108Abnormal   19  107/70  83  96 %  None (Room air)  Lying   07/20/21 1300  --  101  18  103/72  83  95 %  --  --   07/20/21 1230  --  113Abnormal   20  130/76  94  96 %  --  --   07/20/21 1200  --  114Abnormal   18  123/83  97  96 %  --  --   07/20/21 1130  --  100  16  119/88  98  96 %  --  --   07/20/21 1129  96 2 °F (35 7 °C)Abnormal   102  15  112/80  91  97 %  None (Room air)  Lying   07/20/21 1100  --  102  19  100/56  74  97 %  --  --   07/20/21 0953  --  --  --  105/75  --  --  --  --   07/20/21 0945  --  94  19  107/70  80  95 %  --  --   07/20/21 0930  --  103  20  107/74  84  95 %  --  --   07/20/21 0915  --  100  22  103/74  82  96 %  --  --   07/20/21 0900  --  105  22  106/72  85  94 %  --  --   07/20/21 0845  98 6 °F (37 °C)  102  20  102/70  81  95 %  None (Room air)  Lying   07/20/21 0830  --  102  98Abnormal   107/79  89  95 %  --  --   07/20/21 0815  --  107Abnormal   23Abnormal   101/71  81  95 %  --  --   07/20/21 0800  --  109Abnormal   24Abnormal   108/79  88  95 %  --  --   07/20/21 0715  --  136Abnormal   12  114/81  93  96 %  --  --   07/20/21 0706  97 2 °F (36 2 °C)Abnormal   141Abnormal   20  116/71  90  98 %  --  --            Pertinent Labs/Diagnostic Test Results:   CXR  ( 7/20)   NAD  EKG    ( 7/20)    Atrial fibrillation         Results from last 7 days   Lab Units 07/21/21 0522 07/20/21  0733   WBC Thousand/uL 7 46 6 55   HEMOGLOBIN g/dL 14 7 15 9   HEMATOCRIT % 44 4 46 5   PLATELETS Thousands/uL 210 215   NEUTROS ABS Thousands/µL 5 19 4 42         Results from last 7 days   Lab Units 07/21/21 0522 07/20/21  0733   SODIUM mmol/L 140 141   POTASSIUM mmol/L 4 2 3 9   CHLORIDE mmol/L 108 107   CO2 mmol/L 21 23   ANION GAP mmol/L 11 11   BUN mg/dL 22 22   CREATININE mg/dL 0 73 0 97   EGFR ml/min/1 73sq m 97 82   CALCIUM mg/dL 9 0 9 1   MAGNESIUM mg/dL 2 0  --      Results from last 7 days   Lab Units 07/20/21  0733   AST U/L 24   ALT U/L 42   ALK PHOS U/L 84   TOTAL PROTEIN g/dL 8 1   ALBUMIN g/dL 3 9   TOTAL BILIRUBIN mg/dL 0 49         Results from last 7 days   Lab Units 07/21/21  0522 07/20/21  0733   GLUCOSE RANDOM mg/dL 112 127         Results from last 7 days   Lab Units 07/20/21 2012   HEMOGLOBIN A1C % 5 2   EAG mg/dl 103       Results from last 7 days   Lab Units 07/20/21 2214 07/20/21 2012 07/20/21  1641 07/20/21  0734   TROPONIN I ng/mL <0 02 <0 02 <0 02 <0 02         ED Treatment:   Medication Administration from 07/20/2021 0657 to 07/20/2021 1409       Date/Time Order Dose Route Action Comments     07/20/2021 0738 ketorolac (TORADOL) injection 15 mg 15 mg Intravenous Given 07/20/2021 0741 metoprolol (LOPRESSOR) injection 5 mg 5 mg Intravenous Given      07/20/2021 0859 metoprolol (LOPRESSOR) injection 5 mg 5 mg Intravenous Given      07/20/2021 8159 aspirin tablet 325 mg 325 mg Oral Given      07/20/2021 0955 fluticasone (FLOVENT HFA) 220 mcg/act inhaler 2 puff 2 puff Inhalation Given      07/20/2021 0953 verapamil (CALAN-SR) CR tablet 120 mg 120 mg Oral Not Given             Admitting Diagnosis: Chest pain [R07 9]  New onset atrial fibrillation ([facility] [address] ) [I48 91]  Age/Sex: 72 y o  male  Admission Orders:  Scheduled Medications:  apixaban, 5 mg, Oral, BID  [START ON 7/22/2021] aspirin, 81 mg, Oral, Daily  atorvastatin, 40 mg, Oral, Daily With Dinner  doxepin, 100 mg, Oral, HS  fluticasone, 2 puff, Inhalation, Q12H [address]  metoprolol tartrate, 50 mg, Oral, Q12H [place]  tamsulosin, 0 4 mg, Oral, Daily With Dinner      Continuous IV Infusions:     PRN Meds:  acetaminophen, 650 mg, Oral, Q6H PRN  baclofen, 20 mg, Oral, TID PRN  cyclobenzaprine, 10 mg, Oral, BID PRN  HYDROcodone-acetaminophen, 1 tablet, Oral, Q8H PRN  metoprolol, 5 mg, Intravenous, Q6H PRN  nitroglycerin, 0 4 mg, Sublingual, Q5 Min PRN        IP CONSULT TO CARDIOLOGY    Network Utilization Review Department  ATTENTION: Please call with any questions or concerns to [phone] and carefully listen to the prompts so that you are directed to the right person  All voicemails are confidential   Osbaldo Reed all requests for admission clinical reviews, approved or denied determinations and any other requests to dedicated fax number below belonging to the campus where the patient is receiving treatment   List of dedicated fax numbers for the Facilities:  [address] DENIALS (Administrative/Medical Necessity) [phone]   [address] (Maternity/NICU/Pediatrics) [phone] [address] [address] 9948 [facility] 1111 [address] [place] [address],[address] Floor [phone]   Kaylee Cortes [facility] 2657 [address] [address] [facility] [address] [place], [place] [address] [phone]

## 2021-07-21 NOTE — CASE MANAGEMENT
Case Management Assessment & Discharge Planning Note    Patient name Eden Pica  Location /-14 MRN [id number]  : 1955 Date 2021       Current Admission Date: 2021  Current Admission Diagnosis:  Chest pain  Patient Active Problem List   Diagnosis    Facet arthropathy, cervical    Chronic pain of both knees    Lumbar radiculopathy    Neoplasm of unspecified behavior of bone, soft tissue, and skin    Closed compression fracture of L3 lumbar vertebra, initial encounter ([facility] [address] )    Chest pain    Atrial fibrillation ([facility] [address] )    History of CVA (cerebrovascular accident)    Previous Admission - Discharge Date:3/24/21   LOS (days): 1  Geometric Mean LOS (GMLOS) (days):   Days to GMLOS: Previous Discharge Diagnosis:  There are no discharge diagnoses documented for the most recent discharge  Risk of Unplanned Readmission Score  Predictive Model Details          7 (Low)  Factor Value    Calculated 2021 08:06 25% Active NSAID Rx order present    Risk of Unplanned Readmission Model 24% Number of active Rx orders 27     15% Number of ED visits in last six months 2     12% ECG/EKG order present in last 6 months     9% Imaging order present in last 6 months     7% Age 72     6% Active anticoagulant Rx order present     1% Current length of stay 0 977 days     1% Active ulcer medication Rx order present         Clinical_ admit with Chest pain, identified patient is in Afib, cardiology consulted  Pt does not want a stress test  Anticipate he will need eliquis for dc  Used cyracome [id number]  CM met with patient at the bedside,baseline information  was obtained  CM discussed the role of CM in helping the patient develop a discharge plan and assist the patient in carry out their plan            OBJECTIVE:  Pt is a 72y o  year old Single, white or  [1], male with Rastafari preference of Non-Sikh admitted on 2021  6:58 AM  Pt is admitted to [facility] [phone] at [address] [facility] 3601 [facility] with complaints of Chest pain  Current admission status: Inpatient  Preferred Pharmacy:   UNKNOWN - FOLLOW UP PRIOR TO DISCHARGE TO E-PRESCRIBE  No address on file      [address] [place], [place] - [address] [place] [address]  Phone: [phone] Fax: [phone]    Primary Care Provider: Yoselny Gonzalez MD    ASSESSMENT:    Healthcare Proxy  Health Care Agent:   Name: Christian Haas  Relationship: Friend [5]  Home Phone: [phone]  Mobile Phone:   If more than one Healthcare Proxy exists, details will need to be added manually in this note  Guillermina De La Fuente [address] of Residence: [address] [place] [address]         Patient Information  Mental Status: Alert  During Assessment patient was accompanied by: Not accompanied during assessment  Assessment information provided by[de-identified] Patient (Interpretor services [employer] use [id number])  Primary Caregiver: Self  Type of Current Residence:  (multiple floors)  Home entry access options  Select all that apply : Stairs  Number of steps to enter home : 4  Living Arrangements: Lives Alone    Activities of Daily Living Prior to Admission  Functional Status: Independent  Completes ADLs independently?: Yes  Ambulates independently?: Yes  Does patient use assisted devices?: Yes  Assisted Devices (DME) used: Straight Cane  Does patient currently own DME?: No  Does patient have a history of Outpatient Therapy (PT/OT)?: No  Does the patient have a history of Short-Term Rehab?: No  [facility] No  Pt does drive  Per patient he has limited support in the area, no POA/Advance directive       Patient Information Continued  Income Source: Pension/nursing home  Does patient receive dialysis treatments?: No  Does patient have a history of substance abuse?: No  Does patient have a history of Mental Health Diagnosis?: No         Means of Transportation  Means of Transport to Appts[de-identified] Drives Self    DISCHARGE DETAILS:    Discharge planning discussed with[de-identified] pt  Freedom of Choice: Yes  Discharge Destination Plan[de-identified] Home     Dc plan is home, advised following up with PCP post dc  Await prescription to check cost of eliquis  1230 Eliquis cost was checked, cost is $4 and is ready for  at [facility]
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
## 2021-07-21 NOTE — ASSESSMENT & PLAN NOTE
Pt found to be in atrial fibrillation with RVR at admission  He denies history of known a fib  He does report a history of palpitations, 24 hour holter in "care everywhere" from 2015 which states sinus rhythm with rare PAC's, PVC's  He is on ASA 325mg for questionable CVA history  [facility] started Verapamil for migraine management  Currently on Metoprolol with improved rates  Pt spontaneously converted to sinus rhythm during cardiology examination  Titrate Metoprolol tartrate to 50mg BID  Continue Eliquis 5mg BID  Spoke with case management for cost check prior to discharge  May reduce ASA to EC 81mg daily  Continue telemetry during admission  Optimize electrolytes for K+ > 4, Mag > 2  Will need outpatient stress testing and sleep study

## 2021-07-21 NOTE — DISCHARGE SUMMARY
[address]  Discharge- Maria Luisa Burkett 1955, 72 y o  male MRN: [id number]  Unit/Bed#: -01 Encounter: [id number]  Primary Care Provider: Cece Mathur MD   Date and time admitted to hospital: 7/20/2021  6:58 AM    * Chest pain  Assessment & Plan  Started having chest pain at home and felt like he was going to die  Telemetry monitoring: showing afib however spontaneously converted to sinus rhythm during this admission  Trend troponins: negative  Serial EKGs: atrial fibrillation  TTE reviewed  EKG showing afib  Cardiology consult placed, recommendations appreciated  ANTOLIN 3  Patient refusing stress test at this time given bad experience in the past and continues to refuse even after discussion with Cardiology  Will follow-up with Cardiology in clinic  Will maintain patient on ASA 81 mg, Eliquis and metoprolol tartrate 50 mg q12h for atrial fibrillation  Has been asymptomatic this morning      Atrial fibrillation [facility])  Assessment & Plan  Patient presented with afib in the 140s with no known history of afib  Not on any anticoagulation  The patient has been on verapamil and is currently in the 90-100s at this time  Discussed anticoagulation with patient and he is agreeable  Started on Eliquis on 7/20  Switched patient from verapamil to metoprolol tartrate 50 mg q12h   Spontaneously converted to NSR  Patient to follow-up with Cardiology as outpatient    History of CVA (cerebrovascular accident)  Assessment & Plan  Patient had a history of 2 strokes in the past 2 years  Unclear whether this was related to afib as patient does not recall ever being diagnosed with afib however did have a holter monitor placed 4 years ago   Was never started on anticoagulation  Started patient on Eliquis and decreased ASA dose to 81 mg daily        Discharging Physician / Practitioner: Lady Dewayne MD  PCP: Cece Mathur MD  Admission Date:   Admission Orders (From admission, onward)     Ordered        07/20/21 0840  Inpatient Admission  Once                   Discharge Date: 07/21/21    Medical Problems     Resolved Problems  Date Reviewed: 7/21/2021    None                Consultations During Hospital Stay:  · Cardiology    Procedures Performed:   · none    Significant Findings / Test Results:   Echo complete with contrast if indicated    Result Date: 7/20/2021  SUMMARY LEFT VENTRICLE: Size was normal  Systolic function was normal  Ejection fraction was estimated to be 60 %  There were no regional wall motion abnormalities  Wall thickness was mildly increased  Left ventricular diastolic function not assessed due to atrial arrhythmia  LEFT ATRIUM: The atrium was mildly dilated  MITRAL VALVE: There was trace regurgitation  AORTIC VALVE: The valve was trileaflet  Leaflets exhibited mildly increased thickness, mild calcification, normal cuspal separation, and sclerosis  There was no evidence for stenosis  There was no significant regurgitation  TRICUSPID VALVE: There was trace regurgitation  COMPARISONS: No prior study for comparison  XR chest 1 view portable    Result Date: 7/20/2021  Narrative: CHEST INDICATION:   chest pain  COMPARISON: Chest radiograph 5/5/2016, thoracic spine radiographs from 1/2/2018, abdomen CT from 3/24/2021  EXAM PERFORMED/VIEWS:  XR CHEST PORTABLE  FINDINGS: Cardiomediastinal silhouette normal  Lungs clear  No effusion or pneumothorax  Osseous structures normal for age  · Impression: No acute cardiopulmonary disease  Workstation performed: [id number]       Incidental Findings:   · none    Test Results Pending at Discharge (will require follow up):   · none     Outpatient Tests Requested:  · none    Complications:  none    Reason for Admission:  New onset atrial fibrillation    Hospital Course:     Ariana Sharpe is a 72 y o  male patient who originally presented to the hospital on 7/20/2021 due to new onset atrial fibrillation    Patient was found to be in the 140s on admission  Initial EKG showed atrial fibrillation  Patient was monitored on telemetry with improvement in his rates however was persistently in AFib and was complaining of some chest pain that was relieved slightly by nitroglycerin  Troponins were negative  TTE was performed which did not show any abnormalities  Stress test was offered to the patient however patient adamantly refused stating he had a bad experience in the past   Patient was started on Eliquis given history of strokes and AFib  Patient was amenable to this plan  Patient was also switched from verapamil to metoprolol tartrate 50 mg twice a day  Cardiology was consulted on the case and recommendations appreciated  Patient is to follow-up with cardiology as outpatient on discharge  Patient also to follow-up with his primary care provider  Patient is currently stable for discharge home at this time  Please see above list of diagnoses and related plan for additional information  Condition at Discharge: stable     Discharge Day Visit / Exam:     Subjective:  Patient seen examined at bedside  Patient had some episodes of chest pain overnight for which he received nitro  This morning patient is no longer experiencing the chest pain and feels well at this time  He is tolerating all the new medications given to him  Vitals: Blood Pressure: 110/82 (07/21/21 0700)  Pulse: 102 (07/21/21 0700)  Temperature: 98 2 °F (36 8 °C) (07/21/21 0700)  Temp Source: Oral (07/21/21 0700)  Respirations: 18 (07/21/21 0700)  Height: 5' 10" (177 8 cm) (07/20/21 1417)  Weight - Scale: 91 3 kg (201 lb 3 2 oz) (07/20/21 1417)  SpO2: 96 % (07/21/21 0909)    Exam:   Physical Exam  Vitals and nursing note reviewed  Constitutional:       Appearance: He is well-developed  HENT:      Head: Normocephalic and atraumatic     Eyes:      Conjunctiva/sclera: Conjunctivae normal    Cardiovascular:      Rate and Rhythm: Normal rate and regular rhythm  Heart sounds: No murmur heard  Pulmonary:      Effort: Pulmonary effort is normal  No respiratory distress  Breath sounds: Normal breath sounds  Abdominal:      Palpations: Abdomen is soft  Tenderness: There is no abdominal tenderness  Musculoskeletal:      Cervical back: Neck supple  Skin:     General: Skin is warm and dry  Neurological:      Mental Status: He is alert  Discussion with Family:  Discussed with patient at bedside    Discharge instructions/Information to patient and family:   See after visit summary for information provided to patient and family  Provisions for Follow-Up Care:  See after visit summary for information related to follow-up care and any pertinent home health orders  Disposition:     Home    For Discharges to [facility] SNF:   · Not Applicable to this Patient - Not Applicable to this Patient    Planned Readmission:  None     Discharge Statement:  I spent 35 minutes discharging the patient  This time was spent on the day of discharge  I had direct contact with the patient on the day of discharge  Greater than 50% of the total time was spent examining patient, answering all patient questions, arranging and discussing plan of care with patient as well as directly providing post-discharge instructions  Additional time then spent on discharge activities  Discharge Medications:  See after visit summary for reconciled discharge medications provided to patient and family        ** Please Note: This note has been constructed using a voice recognition system **

## 2021-07-21 NOTE — PLAN OF CARE
Problem: CARDIOVASCULAR - ADULT  Goal: Maintains optimal cardiac output and hemodynamic stability  Description: INTERVENTIONS:  - Monitor I/O, vital signs and rhythm  - Monitor for S/S and trends of decreased cardiac output  - Administer and titrate ordered vasoactive medications to optimize hemodynamic stability  - Assess quality of pulses, skin color and temperature  - Assess for signs of decreased coronary artery perfusion  - Instruct patient to report change in severity of symptoms  Outcome: Progressing     Problem: CARDIOVASCULAR - ADULT  Goal: Absence of cardiac dysrhythmias or at baseline rhythm  Description: INTERVENTIONS:  - Continuous cardiac monitoring, vital signs, obtain 12 lead EKG if ordered  - Administer antiarrhythmic and heart rate control medications as ordered  - Monitor electrolytes and administer replacement therapy as ordered  Outcome: Progressing     Problem: RESPIRATORY - ADULT  Goal: Achieves optimal ventilation and oxygenation  Description: INTERVENTIONS:  - Assess for changes in respiratory status  - Assess for changes in mentation and behavior  - Position to facilitate oxygenation and minimize respiratory effort  - Oxygen administered by appropriate delivery if ordered  - Initiate smoking cessation education as indicated  - Encourage broncho-pulmonary hygiene including cough, deep breathe, Incentive Spirometry  - Assess the need for suctioning and aspirate as needed  - Assess and instruct to report SOB or any respiratory difficulty  - Respiratory Therapy support as indicated  Outcome: Progressing

## 2021-07-22 NOTE — UTILIZATION REVIEW
Notification of Discharge   This is a Notification of Discharge from our facility [address]  Please be advised that this patient has been discharge from our facility  Below you will find the admission and discharge date and time including the patients disposition  UTILIZATION REVIEW CONTACT:  Raquel Barnhart  Utilization   Network Utilization Review Department  Phone: [phone] x carefully listen to the prompts  All voicemails are confidential   Email: [email or web address]     PHYSICIAN ADVISORY SERVICES:  FOR [id number] REVIEW - MEDICAL NECESSITY DENIAL  Phone: [phone]  Fax: [phone]  Email: [email or web address]     PRESENTATION DATE: 7/20/2021  6:58 AM  OBERVATION ADMISSION DATE:  INPATIENT ADMISSION DATE: 7/20/21  8:40 AM   DISCHARGE DATE: 7/21/2021  3:21 PM  DISPOSITION: Home/Self Care Home/Self Care      IMPORTANT INFORMATION:  Send all requests for admission clinical reviews, approved or denied determinations and any other requests to dedicated fax number below belonging to the campus where the patient is receiving treatment   List of dedicated fax numbers:  [address] DENIALS (Administrative/Medical Necessity) [phone]   [address] (Maternity/NICU/Pediatrics) [phone]   [facility] [phone]   Dorice Bamberger [phone]   Ming Sosa [phone]   [facility] [address] [place] [address] [phone]   [facility]  [phone]   2203 [facility]  2401 [facility] 1000 [address] [facility] [phone]

## 2021-07-26 LAB
ATRIAL RATE: 178 BPM
QRS AXIS: 29 DEGREES
QRS AXIS: 62 DEGREES
QRSD INTERVAL: 90 MS
QRSD INTERVAL: 90 MS
QT INTERVAL: 274 MS
QT INTERVAL: 344 MS
QTC INTERVAL: 412 MS
QTC INTERVAL: 434 MS
T WAVE AXIS: -3 DEGREES
T WAVE AXIS: 29 DEGREES
VENTRICULAR RATE: 136 BPM
VENTRICULAR RATE: 96 BPM

## 2021-07-26 PROCEDURE — 93010 ELECTROCARDIOGRAM REPORT: CPT | Performed by: INTERNAL MEDICINE

## 2021-08-05 ENCOUNTER — OFFICE VISIT (OUTPATIENT)
Dept: CARDIOLOGY CLINIC | Facility: CLINIC | Age: 66
End: 2021-08-05
Payer: MEDICARE

## 2021-08-05 VITALS
SYSTOLIC BLOOD PRESSURE: 118 MMHG | WEIGHT: 199.2 LBS | DIASTOLIC BLOOD PRESSURE: 80 MMHG | HEART RATE: 65 BPM | BODY MASS INDEX: 28.52 KG/M2 | OXYGEN SATURATION: 94 % | HEIGHT: 70 IN

## 2021-08-05 DIAGNOSIS — I48.91 ATRIAL FIBRILLATION (HCC): Primary | ICD-10-CM

## 2021-08-05 DIAGNOSIS — Z86.73 HISTORY OF CVA (CEREBROVASCULAR ACCIDENT): ICD-10-CM

## 2021-08-05 DIAGNOSIS — G47.30 SLEEP DISORDER BREATHING: ICD-10-CM

## 2021-08-05 DIAGNOSIS — R06.02 SHORTNESS OF BREATH: ICD-10-CM

## 2021-08-05 DIAGNOSIS — R07.2 PRECORDIAL PAIN: ICD-10-CM

## 2021-08-05 PROCEDURE — 99214 OFFICE O/P EST MOD 30 MIN: CPT | Performed by: NURSE PRACTITIONER

## 2021-08-05 RX ORDER — ASPIRIN 81 MG/1
81 TABLET ORAL DAILY
Qty: 90 TABLET | Refills: 3 | Status: SHIPPED | OUTPATIENT
Start: 2021-08-05

## 2021-08-05 RX ORDER — METOPROLOL TARTRATE 50 MG/1
50 TABLET, FILM COATED ORAL EVERY 12 HOURS SCHEDULED
Qty: 180 TABLET | Refills: 3 | Status: SHIPPED | OUTPATIENT
Start: 2021-08-05

## 2021-08-05 NOTE — PATIENT INSTRUCTIONS
I ordered a home sleep study to evaluate for sleep apnea  I recommend a stress test given atrial fibrillation, chest pain, shortness of breath - please consider and notify me if you change your mind  Seek immediate medical attention for any crushing chest pain, new/worsening symptoms  I ordered a 24 hour holter monitor to assess heart rate/rhythm   Continue medications without change today

## 2021-08-05 NOTE — UTILIZATION REVIEW
Notification of Discharge   This is a Notification of Discharge from our facility [address]  Please be advised that this patient has been discharge from our facility  Below you will find the admission and discharge date and time including the patients disposition  UTILIZATION REVIEW CONTACT:  Chandra Cr  Utilization   Network Utilization Review Department  Phone: [phone] x carefully listen to the prompts  All voicemails are confidential   Email: [email or web address]     PHYSICIAN ADVISORY SERVICES:  FOR [id number] REVIEW - MEDICAL NECESSITY DENIAL  Phone: [phone]  Fax: [phone]  Email: [email or web address]     PRESENTATION DATE: 7/20/2021  6:58 AM  OBERVATION ADMISSION DATE:  INPATIENT ADMISSION DATE: 7/20/21  8:40 AM   DISCHARGE DATE: 7/21/2021  3:21 PM  DISPOSITION: Home/Self Care Home/Self Care      IMPORTANT INFORMATION:  Send all requests for admission clinical reviews, approved or denied determinations and any other requests to dedicated fax number below belonging to the campus where the patient is receiving treatment   List of dedicated fax numbers:  [address] DENIALS (Administrative/Medical Necessity) [phone]   [address] (Maternity/NICU/Pediatrics) [phone]   Derick Tate [phone]   Major Rife [phone]   [facility] [phone]   Laxmi López [address] [phone]   [facility]  [phone] 2205 [facility]  2401 [facility] 1000 [address] [facility] [phone]

## 2021-08-06 ENCOUNTER — TELEPHONE (OUTPATIENT)
Dept: CARDIOLOGY CLINIC | Facility: CLINIC | Age: 66
End: 2021-08-06

## 2021-08-06 PROBLEM — R06.02 SHORTNESS OF BREATH: Status: ACTIVE | Noted: 2021-08-06

## 2021-08-06 NOTE — ASSESSMENT & PLAN NOTE
Pt reports intermittent episodes of shortness of breath that are associated with a "poking" pain to his left chest  These can occur with activity or rest   There is a smoking history and he denies recent pulmonary follow up - he is on inhalers  Will obtain 24 hour holter monitor to assess for breakthrough A fib episodes  He is declining a stress test   Home sleep study ordered  Will check BMP and NT proBNP    Recommended pulmonary follow up

## 2021-08-06 NOTE — TELEPHONE ENCOUNTER
----- Message from [place] sent at 8/6/2021 12:47 PM EDT -----  Regarding: FW: SLEEP STUDY    ----- Message -----  From: Nilsa Yarbrough  Sent: 8/6/2021  12:02 PM EDT  To: Manju Bermudez MA  Subject: SLEEP STUDY                                      This patient's medical assistance plan will not approve home study  Must be changed to a dx sleep study done in-house
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
## 2021-08-06 NOTE — ASSESSMENT & PLAN NOTE
Pt found to be in atrial fibrillation with RVR at admission to [address] [place] [address] on 7/20/2021  He denies history of known a fib  He does report a history of palpitations, 24 hour holter in "care everywhere" from 2015 which states sinus rhythm with rare PAC's, PVC's  He was on ASA 325mg for questionable CVA history  [facility] Neurology started Verapamil for migraine management  Started on Metoprolol during hospitalization  Echo 7/20/2021 with EF 60% and mildly dilated left atrium  Pt spontaneously converted to sinus rhythm during admission  He remains in sinus rhythm today  Continue Metoprolol tartrate 50mg BID  Continue Eliquis 5mg BID  ASA reduced to EC 81mg daily prior to hospital discharge - renewed script  Need outpatient stress testing for ischemic work up, however pt declines  He is agreeable to a home sleep study  24 hour holter monitor ordered to assess for A fib burden given continued symptoms of intermittent chest discomfort and shortness of breath

## 2021-08-12 ENCOUNTER — TELEPHONE (OUTPATIENT)
Dept: SLEEP CENTER | Facility: CLINIC | Age: 66
End: 2021-08-12

## 2021-08-12 NOTE — TELEPHONE ENCOUNTER
----- Message from Dean Barros MD sent at 8/6/2021  1:30 PM EDT -----  approved  ----- Message -----  From: Tameka Wu  Sent: 8/6/2021   9:05 AM EDT  To: Sleep Medicine [facility] Provider    This sleep study needs approval      If approved please sign and return to clerical pool  If denied please include reasons why  Also provide alternative testing if warranted  Please sign and return to clerical pool

## 2021-08-17 ENCOUNTER — HOSPITAL ENCOUNTER (OUTPATIENT)
Dept: NON INVASIVE DIAGNOSTICS | Facility: HOSPITAL | Age: 66
Discharge: HOME | End: 2021-08-17
Payer: MEDICARE

## 2021-08-17 DIAGNOSIS — I48.91 ATRIAL FIBRILLATION (HCC): ICD-10-CM

## 2021-08-17 PROCEDURE — 93226 XTRNL ECG REC<48 HR SCAN A/R: CPT

## 2021-08-17 PROCEDURE — 93225 XTRNL ECG REC<48 HRS REC: CPT

## 2021-08-27 NOTE — UTILIZATION REVIEW
Initial Clinical Review     Admission: Date/Time/Statement:       Admission Orders (From admission, onward)              Ordered          07/20/21 0840   Inpatient Admission  Once                             Orders Placed This Encounter   Procedures    Inpatient Admission       Standing Status:   Standing       Number of Occurrences:   1       Order Specific Question:   Level of Care       Answer:   Med Surg [16]       Order Specific Question:   Estimated length of stay       Answer:   More than 2 Midnights       Order Specific Question:   Certification       Answer:   I certify that inpatient services are medically necessary for this patient for a duration of greater than two midnights  See H&P and MD Progress Notes for additional information about the patient's course of treatment             ED Arrival Information      Expected Arrival Acuity     - 7/20/2021 06:57 Emergent           Means of arrival Escorted by Service Admission type     Walk-In Self Hospitalist Emergency           Arrival complaint     chest pain               Chief Complaint   Patient presents with    Chest Pain       Pt woke around 0400 with pressure in left chest, pt hx of MI          Initial Presentation: 72  Y O male presents to ED from home with chest pain which started early the am of admission  Pain described as achy and heavy over his left chest, lasted a long time   + diaphoresis  "Felt like he was going to die "  Found  In  Afib  In ED, heart rate in the  140's, persistent chest pain  Had a similar incident  About  4 years ago, felt like his heart  Was going to jump out of his chest,  Saw practitoner and  Sent home with a holtor monitor  Never  Told he had an irregular heart rate at that time  ANTOLIN   3    PMH  Is  CVA   Admit  Ip with  Chest pain, Atrial fibrillation and plan is  Cardiology consult, monitor labs, refusing stress test, serial EKG's, start Eliquis, tele and trend troponin        Cardiology consult  ( 7/21) Spontaneously  Converted to NSR during  Cardiology  Eval   CVA  History is questionable   Continue aspirin, statin and metoprolol    Needs close  OP cardiology  F/u              ED Triage Vitals   Temperature Pulse Respirations Blood Pressure SpO2   07/20/21 0706 07/20/21 0706 07/20/21 0706 07/20/21 0706 07/20/21 0706   (!) 97 2 °F (36 2 °C) (!) 141 20 116/71 98 %       Temp Source Heart Rate Source Patient Position - Orthostatic VS BP Location FiO2 (%)   07/20/21 0706 07/20/21 0706 07/20/21 0845 07/20/21 0845 --   Temporal Monitor Lying Right arm         Pain Score           07/20/21 0738           6                  Wt Readings from Last 1 Encounters:   07/20/21 91 3 kg (201 lb 3 2 oz)      Additional Vital Signs:   21/21 0833   --   --   --   --   --   96 %   None (Room air)   --   07/21/21 0700   98 2 °F (36 8 °C)   102   18   110/82   94   97 %   --   --   07/21/21 05:26:36   --   103   --   108/86   93   97 %   --   --   07/21/21 05:14:24   97 3 °F (36 3 °C)Abnormal    108Abnormal    13   119/79   92   97 %   --   --   07/20/21 23:03:43   98 8 °F (37 1 °C)   79   17   118/80   93   95 %   --   --   07/20/21 2100   --   --   --   --   --   --   None (Room air)   --   07/20/21 20:30:14   --   --   --   120/81   94   --   --   --   07/20/21 20:13:14   --   100   --   141/88   106   98 %   --   --   07/20/21 20:04:41   --   111Abnormal    --   154/94   114   96 %   --   --   07/20/21 14:17:12   98 °F (36 7 °C)   105   18   118/80   93   97 %   --   --   07/20/21 1320   97 8 °F (36 6 °C)   108Abnormal    19   107/70   83   96 %   None (Room air)   Lying   07/20/21 1300   --   101   18   103/72   83   95 %   --   --   07/20/21 1230   --   113Abnormal    20   130/76   94   96 %   --   --   07/20/21 1200   --   114Abnormal    18   123/83   97   96 %   --   --   07/20/21 1130   --   100   16   119/88   98   96 %   --   --   07/20/21 1129   96 2 °F (35 7 °C)Abnormal    102   15   112/80   91   97 %   None (Room air)   Lying   07/20/21 1100   --   102   19   100/56   74   97 %   --   --   07/20/21 0953   --   --   --   105/75   --   --   --   --   07/20/21 0945   --   94   19   107/70   80   95 %   --   --   07/20/21 0930   --   103   20   107/74   84   95 %   --   --   07/20/21 0915   --   100   22   103/74   82   96 %   --   --   07/20/21 0900   --   105   22   106/72   85   94 %   --   --   07/20/21 0845   98 6 °F (37 °C)   102   20   102/70   81   95 %   None (Room air)   Lying   07/20/21 0830   --   102   98Abnormal    107/79   89   95 %   --   --   07/20/21 0815   --   107Abnormal    23Abnormal    101/71   81   95 %   --   --   07/20/21 0800   --   109Abnormal    24Abnormal    108/79   88   95 %   --   --   07/20/21 0715   --   136Abnormal    12   114/81   93   96 %   --   --   07/20/21 0706   97 2 °F (36 2 °C)Abnormal    141Abnormal    20   116/71   90   98 %   --   --                Pertinent Labs/Diagnostic Test Results:   CXR  ( 7/20)   NAD  EKG    ( 7/20)    Atrial fibrillation               Results from last 7 days   Lab Units 07/21/21  0522 07/20/21  0733   WBC Thousand/uL 7 46 6 55   HEMOGLOBIN g/dL 14 7 15 9   HEMATOCRIT % 44 4 46 5   PLATELETS Thousands/uL 210 215   NEUTROS ABS Thousands/µL 5 19 4 42                Results from last 7 days   Lab Units 07/21/21  0522 07/20/21  0733   SODIUM mmol/L 140 141   POTASSIUM mmol/L 4 2 3 9   CHLORIDE mmol/L 108 107   CO2 mmol/L 21 23   ANION GAP mmol/L 11 11   BUN mg/dL 22 22   CREATININE mg/dL 0 73 0 97   EGFR ml/min/1 73sq m 97 82   CALCIUM mg/dL 9 0 9 1   MAGNESIUM mg/dL 2 0  --            Results from last 7 days   Lab Units 07/20/21  0733   AST U/L 24   ALT U/L 42   ALK PHOS U/L 84   TOTAL PROTEIN g/dL 8 1   ALBUMIN g/dL 3 9   TOTAL BILIRUBIN mg/dL 0 49                Results from last 7 days   Lab Units 07/21/21  0522 07/20/21  0733   GLUCOSE RANDOM mg/dL 112 127               Results from last 7 days   Lab Units 07/20/21 2012   HEMOGLOBIN A1C % 5 2   EAG mg/dl 103 Results from last 7 days   Lab Units 07/20/21  2214 07/20/21 2012 07/20/21  1641 07/20/21  0734   TROPONIN I ng/mL <0 02 <0 02 <0 02 <0 02          ED Treatment:   Medication Administration from 07/20/2021 0657 to 07/20/2021 1409        Date/Time Order Dose Route Action Comments       07/20/2021 0738 ketorolac (TORADOL) injection 15 mg 15 mg Intravenous Given         07/20/2021 0741 metoprolol (LOPRESSOR) injection 5 mg 5 mg Intravenous Given         07/20/2021 0859 metoprolol (LOPRESSOR) injection 5 mg 5 mg Intravenous Given         07/20/2021 0952 aspirin tablet 325 mg 325 mg Oral Given         07/20/2021 0955 fluticasone (FLOVENT HFA) 220 mcg/act inhaler 2 puff 2 puff Inhalation Given         07/20/2021 0953 verapamil (CALAN-SR) CR tablet 120 mg 120 mg Oral Not Given                  Admitting Diagnosis: Chest pain [R07 9]  New onset atrial fibrillation (HCC) [I48 91]  Age/Sex: 72 y o  male  Admission Orders:  Scheduled Medications:  apixaban, 5 mg, Oral, BID  [START ON 7/22/2021] aspirin, 81 mg, Oral, Daily  atorvastatin, 40 mg, Oral, Daily With Dinner  doxepin, 100 mg, Oral, HS  fluticasone, 2 puff, Inhalation, Q12H [address]  metoprolol tartrate, 50 mg, Oral, Q12H [place]  tamsulosin, 0 4 mg, Oral, Daily With Dinner        Continuous IV Infusions:  PRN Meds:  acetaminophen, 650 mg, Oral, Q6H PRN  baclofen, 20 mg, Oral, TID PRN  cyclobenzaprine, 10 mg, Oral, BID PRN  HYDROcodone-acetaminophen, 1 tablet, Oral, Q8H PRN  metoprolol, 5 mg, Intravenous, Q6H PRN  nitroglycerin, 0 4 mg, Sublingual, Q5 Min PRN           IP CONSULT TO CARDIOLOGY     Notification of Discharge   This is a Notification of Discharge from our facility [address]  Please be advised that this patient has been discharge from our facility  Below you will find the admission and discharge date and time including the patients disposition     UTILIZATION REVIEW CONTACT:  Ene Whiting  Utilization   Network Utilization Review Department  Phone: [phone] x carefully listen to the prompts  All voicemails are confidential   Email: [email or web address]     PHYSICIAN ADVISORY SERVICES:  FOR LGTE-MD-LIRY REVIEW - MEDICAL NECESSITY DENIAL  Phone: [phone]  Fax: [phone]  Email: [email or web address]     PRESENTATION DATE: 7/20/2021  6:58 AM    INPATIENT ADMISSION DATE: 7/20/21  8:40 AM   DISCHARGE DATE: 7/21/2021  3:21 PM  DISPOSITION: Home/Self Care Home/Self Care      IMPORTANT INFORMATION:  Send all requests for admission clinical reviews, approved or denied determinations and any other requests to dedicated fax number below belonging to the campus where the patient is receiving treatment   List of dedicated fax numbers:  [address] DENIALS (Administrative/Medical Necessity) [phone]   [address] (Maternity/NICU/Pediatrics) [phone]   Abdoulaye Days [phone]   Thony Moncada [phone]   [facility] [phone]   [address] [phone]   [facility]  [phone]   [address] [place] [address]  [address] [facility] 1000 [facility] [phone]

## 2021-08-30 PROCEDURE — 93227 XTRNL ECG REC<48 HR R&I: CPT | Performed by: INTERNAL MEDICINE

## 2021-08-31 ENCOUNTER — TELEPHONE (OUTPATIENT)
Dept: CARDIOLOGY CLINIC | Facility: CLINIC | Age: 66
End: 2021-08-31

## 2021-08-31 ENCOUNTER — TELEPHONE (OUTPATIENT)
Dept: SLEEP CENTER | Facility: CLINIC | Age: 66
End: 2021-08-31

## 2021-08-31 NOTE — TELEPHONE ENCOUNTER
----- Message from Kim Lawton 82 sent at 8/30/2021 11:38 AM EDT -----  Please notify pt that his 24 hour holter monitor showed sinus rhythm, no a fib captured  Rare early beats from top and bottom of the heart   No major arrhythmia or obvious cause for symptoms

## 2021-09-14 ENCOUNTER — TELEPHONE (OUTPATIENT)
Dept: SLEEP CENTER | Facility: CLINIC | Age: 66
End: 2021-09-14

## 2021-09-14 NOTE — TELEPHONE ENCOUNTER
Called the Patient and let him a voicemail letting him know that his sleep study is not covered by insurance and that a message was sent to the ordering doctor for the correct sleep study order

## 2022-02-11 ENCOUNTER — OFFICE VISIT (OUTPATIENT)
Dept: CARDIOLOGY CLINIC | Facility: CLINIC | Age: 67
End: 2022-02-11
Payer: MEDICARE

## 2022-02-11 VITALS
DIASTOLIC BLOOD PRESSURE: 84 MMHG | HEIGHT: 70 IN | OXYGEN SATURATION: 98 % | TEMPERATURE: 97.7 F | HEART RATE: 83 BPM | SYSTOLIC BLOOD PRESSURE: 138 MMHG | BODY MASS INDEX: 31.64 KG/M2 | WEIGHT: 221 LBS

## 2022-02-11 DIAGNOSIS — Z86.73 HISTORY OF CVA (CEREBROVASCULAR ACCIDENT): ICD-10-CM

## 2022-02-11 DIAGNOSIS — M47.812 FACET ARTHROPATHY, CERVICAL: ICD-10-CM

## 2022-02-11 DIAGNOSIS — G47.30 SLEEP DISORDER BREATHING: ICD-10-CM

## 2022-02-11 DIAGNOSIS — R07.2 PRECORDIAL PAIN: ICD-10-CM

## 2022-02-11 DIAGNOSIS — I48.0 PAROXYSMAL ATRIAL FIBRILLATION (HCC): Primary | ICD-10-CM

## 2022-02-11 DIAGNOSIS — R06.02 SHORTNESS OF BREATH: ICD-10-CM

## 2022-02-11 DIAGNOSIS — E78.2 MIXED HYPERLIPIDEMIA: ICD-10-CM

## 2022-02-11 PROCEDURE — 99214 OFFICE O/P EST MOD 30 MIN: CPT | Performed by: NURSE PRACTITIONER

## 2022-02-11 RX ORDER — KETOCONAZOLE 20 MG/ML
SHAMPOO TOPICAL
COMMUNITY
Start: 2022-01-07

## 2022-02-11 RX ORDER — ACETAMINOPHEN 325 MG/1
650 TABLET ORAL EVERY 8 HOURS PRN
Qty: 100 TABLET | Refills: 3 | Status: SHIPPED | OUTPATIENT
Start: 2022-02-11

## 2022-02-11 NOTE — PATIENT INSTRUCTIONS
I have ordered the sleep study to check for sleep apnea which can cause your irregular heart rhythm  Please let us know if you are getting chest pain  I do recommend a stress test, please let me know if you agree to order  Please take your medication for your heart every day - it is dangerous to skip doses  If any unusual bleeding contact us immediately  Call with any concerns

## 2022-02-13 PROBLEM — G47.30 SLEEP DISORDER BREATHING: Status: ACTIVE | Noted: 2022-02-13

## 2022-02-13 PROBLEM — E78.2 MIXED HYPERLIPIDEMIA: Status: ACTIVE | Noted: 2022-02-13

## 2022-02-13 NOTE — ASSESSMENT & PLAN NOTE
Pt presented to [address] [place] [address] ER on 7/20/2021 with left sided, pressure pain, that he states is worse with deep breathing, palpitation, and movement of his left arm  There are inconsistencies to his chest pain story, which may be partially due to language barrier  He continues to c/o atypical left sided chest pressure as well as dyspnea on exertion  He declines stress test, cardiac CT angiogram, or cardiac catheterization, which were all reviewed with patient today  Continue aspirin 81 mg daily, atorvastatin 40 mg daily, metoprolol tartrate 50 mg BID, and Eliquis 5 mg BID  Reviewed urgent s/s to report and when to seek emergent medical attention

## 2022-02-13 NOTE — ASSESSMENT & PLAN NOTE
Pt reports intermittent episodes of shortness of breath that are associated with a "poking" pain to his left chest  These can occur with activity or rest   He also reports dyspnea on exertion, particularly climbing steps  His activity is limited due to chronic back pain  There is a smoking history and he denies recent pulmonary follow up - he is on inhalers  Echo in 7/2021 with EF 60% and no significant wall motion or valvular abnormalities  He is declining a stress test   Insurance would not cover a home sleep study, will obtain in-lab study  No evidence of volume overload on exam  He remains in sinus rhythm with no recurrent a fib on 24 hour holter monitor  Recommended pulmonary follow up

## 2022-02-13 NOTE — ASSESSMENT & PLAN NOTE
Patient with chronic neck pain and headaches  He requests a refill of Acetaminophen today, which I have renewed

## 2022-02-13 NOTE — ASSESSMENT & PLAN NOTE
Patient admits to snoring and daytime sleepiness  Previously ordered a home sleep study, which insurance will not approve  Ordered in-lab sleep study today
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
## 2022-02-13 NOTE — ASSESSMENT & PLAN NOTE
Lipid panel 7/2021: C 194  T 96  H 36  L 139  Goal LDL < 70 given CVA history  On atorvastatin 40 mg daily  Given another order for updated lipid panel today  Will adjust medication as needed to achieve goal LDL
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
## 2022-02-13 NOTE — ASSESSMENT & PLAN NOTE
Pt found to be in atrial fibrillation with RVR at admission to [address] [place] [address] on 7/20/2021  He denies history of known a fib  He does report a history of palpitations, 24 hour holter in "care everywhere" from 2015 which states sinus rhythm with rare PAC's, PVC's  He was on ASA 325mg for questionable CVA history  [facility] started verapamil for migraine management  Started on metoprolol tartrate during hospitalization  Echo 7/20/2021 with EF 60% and mildly dilated left atrium  Pt spontaneously converted to sinus rhythm during admission  Continue metoprolol tartrate 50mg BID  Continue Eliquis 5mg BID for stroke prevention  ASA EC 81mg daily due to history of vascular disease  24 hour holter monitor 8/17/2021 with no recurrent a fib, avg HR 68 bpm   Need outpatient stress testing for ischemic work up, however pt declines  He is agreeable to a sleep study, this was delayed as his insurance will not approve a home study  Ordering in-lab study today  He denies any recurrent symptoms  He struggles with treatment compliance  I reviewed again today the importance of faithful medication administration

## 2022-02-14 ENCOUNTER — TELEPHONE (OUTPATIENT)
Dept: SLEEP CENTER | Facility: CLINIC | Age: 67
End: 2022-02-14

## 2022-02-14 NOTE — TELEPHONE ENCOUNTER
----- Message from Kenny Schuler MD sent at 2/14/2022  9:06 AM EST -----  approved  ----- Message -----  From: Alanna Kiser  Sent: 2/14/2022   7:30 AM EST  To: Sleep Medicine [place] Provider    This sleep study needs approval      If approved please sign and return to clerical pool  If denied please include reasons why  Also provide alternative testing if warranted  Please sign and return to clerical pool

## 2022-06-24 ENCOUNTER — HOSPITAL ENCOUNTER (OUTPATIENT)
Dept: SLEEP CENTER | Facility: HOSPITAL | Age: 67
Discharge: HOME | End: 2022-06-24
Payer: COMMERCIAL

## 2022-06-24 DIAGNOSIS — G47.30 SLEEP DISORDER BREATHING: ICD-10-CM

## 2022-06-24 PROCEDURE — 95810 POLYSOM 6/> YRS 4/> PARAM: CPT

## 2022-06-25 NOTE — PROGRESS NOTES
Sleep Study Documentation    Pre-Sleep Study       Sleep testing procedure explained to patient:YES    Patient napped prior to study:YES- less than 30 minutes  Napped after 2PM: no    Caffeine:Dayshift worker after 12PM   Caffeine use:NO    Alcohol:Dayshift workers after 5PM: Alcohol use:NO    Typical day for patient:YES       Study Documentation    Sleep Study Indications: The patient is here for snoring and excessive daytime sleepiness  Sleep Study: Diagnostic   Snore:Mild  Supplemental O2: no    O2 flow rate (L/min) range N/A  O2 flow rate (L/min) final N/A  Minimum SaO2 88  Baseline SaO2 92        Mode of Therapy:N/A    EKG abnormalities: no     EEG abnormalities: no    Sleep Study Recorded < 2 hours: N/A    Sleep Study Recorded > 2 hours but incomplete study: N/A    Sleep Study Recorded 6 hours but no sleep obtained: NO    Patient classification: retired       Post-Sleep Study    Medication used at bedtime or during sleep study:NO    Patient reports time it took to fall asleep:30 to 60 minutes    Patient reports waking up during study:Denied    Patient reports sleeping 4 to 6 hours without dreaming  Patient reports sleep during study:typical    Patient rated sleepiness: Not sleepy or tired    PAP treatment:no

## 2022-06-28 ENCOUNTER — TELEPHONE (OUTPATIENT)
Dept: CARDIOLOGY CLINIC | Facility: CLINIC | Age: 67
End: 2022-06-28

## 2022-06-28 NOTE — TELEPHONE ENCOUNTER
----- Message from Kim Lawton 82 sent at 6/27/2022 12:13 PM EDT -----  Please let patient know that his sleep study showed severe sleep apnea, would strongly recommend a CPAP titration study and referral to sleep medicine  Please let me know, if agreeable I will order

## 2022-07-07 ENCOUNTER — TELEPHONE (OUTPATIENT)
Dept: SLEEP CENTER | Facility: CLINIC | Age: 67
End: 2022-07-07

## 2022-07-13 NOTE — TELEPHONE ENCOUNTER
Called and left message to call office to review sleep study results    Study shows severe YEVGENIY  AHI [id number]  Per study order, ordering provider [id number] cardiology is recommending CPAP study and referral to sleep medicine  Order for CPAP study has not yet been placed  Need to schedule consult

## 2022-08-11 ENCOUNTER — OFFICE VISIT (OUTPATIENT)
Dept: CARDIOLOGY CLINIC | Facility: CLINIC | Age: 67
End: 2022-08-11
Payer: COMMERCIAL

## 2022-08-11 VITALS
DIASTOLIC BLOOD PRESSURE: 80 MMHG | WEIGHT: 210.2 LBS | HEIGHT: 70 IN | OXYGEN SATURATION: 94 % | HEART RATE: 76 BPM | SYSTOLIC BLOOD PRESSURE: 138 MMHG | BODY MASS INDEX: 30.09 KG/M2

## 2022-08-11 DIAGNOSIS — E78.2 MIXED HYPERLIPIDEMIA: ICD-10-CM

## 2022-08-11 DIAGNOSIS — R07.2 PRECORDIAL PAIN: ICD-10-CM

## 2022-08-11 DIAGNOSIS — Z86.73 HISTORY OF CVA (CEREBROVASCULAR ACCIDENT): ICD-10-CM

## 2022-08-11 DIAGNOSIS — K14.3 BLACK HAIRY TONGUE: ICD-10-CM

## 2022-08-11 DIAGNOSIS — I48.91 ATRIAL FIBRILLATION (HCC): ICD-10-CM

## 2022-08-11 DIAGNOSIS — R06.02 SHORTNESS OF BREATH: ICD-10-CM

## 2022-08-11 DIAGNOSIS — I48.0 PAROXYSMAL ATRIAL FIBRILLATION (HCC): Primary | ICD-10-CM

## 2022-08-11 DIAGNOSIS — G47.33 OSA (OBSTRUCTIVE SLEEP APNEA): ICD-10-CM

## 2022-08-11 PROBLEM — G47.30 SLEEP DISORDER BREATHING: Status: RESOLVED | Noted: 2022-02-13 | Resolved: 2022-08-11

## 2022-08-11 PROCEDURE — 99214 OFFICE O/P EST MOD 30 MIN: CPT | Performed by: INTERNAL MEDICINE

## 2022-08-11 RX ORDER — HYDROXYZINE 50 MG/1
TABLET, FILM COATED ORAL
COMMUNITY
Start: 2022-05-24

## 2022-08-11 RX ORDER — METOCLOPRAMIDE 5 MG/1
TABLET ORAL
COMMUNITY
Start: 2022-05-24

## 2022-08-11 RX ORDER — LACTULOSE 10 G/15ML
SOLUTION ORAL
COMMUNITY
Start: 2022-06-07

## 2022-08-11 RX ORDER — AZELASTINE 1 MG/ML
SPRAY, METERED NASAL
COMMUNITY
Start: 2022-06-07

## 2022-08-11 RX ORDER — CLOTRIMAZOLE 10 MG/1
10 LOZENGE ORAL; TOPICAL
Qty: 50 TABLET | Refills: 0 | Status: SHIPPED | OUTPATIENT
Start: 2022-08-11 | End: 2022-08-11

## 2022-08-11 RX ORDER — DICYCLOMINE HYDROCHLORIDE 10 MG/1
10 CAPSULE ORAL 3 TIMES DAILY
COMMUNITY
Start: 2022-05-24

## 2022-08-11 RX ORDER — FLUTICASONE PROPIONATE 50 MCG
SPRAY, SUSPENSION (ML) NASAL
COMMUNITY
Start: 2022-05-24

## 2022-08-11 RX ORDER — DICLOFENAC POTASSIUM 50 MG/1
50 TABLET, FILM COATED ORAL 3 TIMES DAILY
COMMUNITY
Start: 2022-05-24

## 2022-08-11 RX ORDER — ISOSORBIDE MONONITRATE 30 MG/1
15 TABLET, EXTENDED RELEASE ORAL AS NEEDED
COMMUNITY
Start: 2022-06-07

## 2022-08-11 RX ORDER — FEXOFENADINE HCL 180 MG/1
TABLET ORAL
COMMUNITY
Start: 2022-05-24

## 2022-08-11 RX ORDER — CLOBETASOL PROPIONATE 0.5 MG/G
AEROSOL, FOAM TOPICAL
COMMUNITY
Start: 2022-08-09

## 2022-08-11 NOTE — PATIENT INSTRUCTIONS
We will assist with scheduling the sleep specialist follow up as it is very important that your severe sleep apnea is treated  Bring all your pill bottles to your next appointment so we can review your medications  For the fungal  infection on your tongue we have ordered Mycelex to use 5 times daily for 10 days  Strongly urge smoking cessation
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
## 2022-08-15 PROCEDURE — 93000 ELECTROCARDIOGRAM COMPLETE: CPT | Performed by: INTERNAL MEDICINE

## 2022-08-25 ENCOUNTER — OFFICE VISIT (OUTPATIENT)
Dept: CARDIOLOGY CLINIC | Facility: CLINIC | Age: 67
End: 2022-08-25
Payer: COMMERCIAL

## 2022-08-25 VITALS
BODY MASS INDEX: 29.78 KG/M2 | DIASTOLIC BLOOD PRESSURE: 68 MMHG | WEIGHT: 208 LBS | SYSTOLIC BLOOD PRESSURE: 100 MMHG | OXYGEN SATURATION: 96 % | HEIGHT: 70 IN | HEART RATE: 76 BPM

## 2022-08-25 DIAGNOSIS — E78.2 MIXED HYPERLIPIDEMIA: ICD-10-CM

## 2022-08-25 DIAGNOSIS — G47.33 OSA (OBSTRUCTIVE SLEEP APNEA): ICD-10-CM

## 2022-08-25 DIAGNOSIS — R07.2 PRECORDIAL PAIN: ICD-10-CM

## 2022-08-25 DIAGNOSIS — I48.0 PAROXYSMAL ATRIAL FIBRILLATION (HCC): Primary | ICD-10-CM

## 2022-08-25 DIAGNOSIS — Z86.73 HISTORY OF CVA (CEREBROVASCULAR ACCIDENT): ICD-10-CM

## 2022-08-25 DIAGNOSIS — R06.02 SHORTNESS OF BREATH: ICD-10-CM

## 2022-08-25 PROCEDURE — 99214 OFFICE O/P EST MOD 30 MIN: CPT | Performed by: NURSE PRACTITIONER

## 2022-08-25 RX ORDER — ACETAMINOPHEN 325 MG/1
650 TABLET ORAL AS NEEDED
COMMUNITY
End: 2022-09-13
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
## 2022-08-25 NOTE — PATIENT INSTRUCTIONS
Please review all your pill bottles at home and compare to our list we give to you today  Report any new or worsening chest pain or fluttering  It is important that you take verapamil and Eliquis every day as prescribed  Report any bleeding concerns  Do not take diclofenac  I am renewing the prescription for your atorvastatin (cholesterol pill)  Be sure to take daily

## 2022-09-04 NOTE — ASSESSMENT & PLAN NOTE
Severe YEVGENIY as per in-lab sleep study 6/24/22  Needs appt with sleep medicine which has been requested  Our office is assisting to set this up  We reviewed again today the importance of YEVGENIY management in the setting of atrial fibrillation

## 2022-09-04 NOTE — ASSESSMENT & PLAN NOTE
Pt presented to [address] [place] [address] ER on 7/20/2021 with left sided, pressure pain, that he states is worse with deep breathing, palpitation, and movement of his left arm  There are inconsistencies to his chest pain story, which may be partially due to language barrier  He continues to c/o dyspnea on exertion  He declines stress test, cardiac CT angiogram, or cardiac catheterization  Continue aspirin 81 mg daily, atorvastatin 40 mg daily, and Eliquis 5 mg BID  Previously prescribed metoprolol tartrate through our office but patient is not taking this  No current chest pain complaints  Reviewed urgent s/s to report and when to seek emergent medical attention

## 2022-09-04 NOTE — ASSESSMENT & PLAN NOTE
Pt reports intermittent episodes of shortness of breath that are associated with a "poking" pain to his left chest  These can occur with activity or rest   He also reports dyspnea on exertion, particularly climbing steps  His activity is limited due to chronic back pain  There is a smoking history and he denies recent pulmonary follow up - he is on inhalers  Echo in 7/2021 with EF 60% and no significant wall motion or valvular abnormalities  He is declining a stress test   He has severe sleep apnea, which is likely contributing    No evidence of volume overload on exam

## 2022-09-04 NOTE — ASSESSMENT & PLAN NOTE
Lipid panel 6/7/2022: C 269  T 142  H 41  L 200  Goal LDL < 70 given CVA history  Previously on atorvastatin 40 mg daily however he does not have a pill bottle for this medication today  Renewed the script for atorvastatin  Repeat lipid panel in 8 weeks

## 2022-09-04 NOTE — ASSESSMENT & PLAN NOTE
Pt found to be in atrial fibrillation with RVR at admission to [address] [place] [address] on 7/20/2021  He denies history of known a fib  He does report a history of palpitations, 24 hour holter in "care everywhere" from 2015 which states sinus rhythm with rare PAC's, PVC's  He was on ASA 325mg for questionable CVA history  [address] [facility] started verapamil for migraine management  Started on metoprolol tartrate during hospitalization  Echo 7/20/2021 with EF 60% and mildly dilated left atrium  Pt spontaneously converted to sinus rhythm during admission  Started on metoprolol tartrate 50mg BID, however this has been discontinued at some point by another provider  Continue Eliquis 5mg BID for stroke prevention  ASA EC 81mg daily due to history of vascular disease  Continue verapamil 120 mg daily  24 hour holter monitor 8/17/2021 with no recurrent a fib, avg HR 68 bpm   Need outpatient stress testing for ischemic work up, however pt declines  Diagnosed with severe YEVGENIY and awaiting sleep medicine appointment  He denies any recurrent symptoms  He struggles with treatment compliance  I reviewed again today the importance of faithful medication administration

## 2022-09-13 DIAGNOSIS — I48.91 ATRIAL FIBRILLATION (HCC): ICD-10-CM

## 2022-09-13 RX ORDER — APIXABAN 5 MG/1
TABLET, FILM COATED ORAL
Qty: 180 TABLET | Refills: 2 | Status: SHIPPED | OUTPATIENT
Start: 2022-09-13 | End: 2022-09-15

## 2022-09-15 RX ORDER — APIXABAN 5 MG/1
TABLET, FILM COATED ORAL
Qty: 180 TABLET | Refills: 0 | Status: SHIPPED | OUTPATIENT
Start: 2022-09-15

## 2023-03-23 ENCOUNTER — TELEPHONE (OUTPATIENT)
Dept: CARDIOLOGY CLINIC | Facility: CLINIC | Age: 68
End: 2023-03-23

## 2023-03-23 DIAGNOSIS — I48.91 ATRIAL FIBRILLATION (HCC): ICD-10-CM

## 2023-03-24 NOTE — TELEPHONE ENCOUNTER
I reordered, but it looks like the patient has cancelled his 2 most recent appointments  Can you verify if he plans to follow up with our office? Thank you!

## 2023-06-17 NOTE — ASSESSMENT & PLAN NOTE
Pt presentedto [facility] ER on 7/20/2021 with left sided, pressure pain, that he states is worse with deep breathing, palpitation, and movement of his left arm  There are inconsistencies to his chest pain story, which may be partially due to language barrier  Troponin levels x 4 <0 02  ECG initially a fib with RVR  Pt spontaneously converted to sinus rhythm during admission  Pt initially reported some relief with nitroglycerin, however he received 3 doses of nitroglycerin overnight 7/20 that did not provide relief  He continues to c/o atypical left sided chest pressure that is constant with an occasional "poking" pain  There is some degree of reproduction with palpitation but he also states the pain can be associated with shortness of breath  He denies palpitations or lightheadedness  He declines stress testing at this time  Will obtain 24 hour holter monitor to assess for burden of breakthrough A fib  Continue Aspirin at 81mg daily, Atorvastatin 40mg daily, Metoprolol tartrate 50mg BID, and Eliquis 5mg BID  Close follow up  Reviewed urgent s/s to report and when to seek emergent medical attention  alert and awake/follows commands

## 2023-06-19 DIAGNOSIS — I48.91 ATRIAL FIBRILLATION (HCC): ICD-10-CM

## 2023-06-19 NOTE — TELEPHONE ENCOUNTER
I received a refill request for patient's Eliquis, however he cancelled his last appointment and did not wish to reschedule  Can you find out if he is going elsewhere? Should have this filled by whoever is following him now    Thank you

## 2023-06-19 NOTE — LETTER
[address]  [address] [place] [address] [address]  [facility] [address]  Phone#  [phone]  Fax#  [phone]      June 26, 2023      Amina Collazo    We have been unable to contact you  If you would still like to be seen by this specialty,     please give us a call      Sincerely,    Nathalia Wolfe

## (undated) DEVICE — PAD GROUNDING ADULT

## (undated) DEVICE — GLOVE INDICATOR PI UNDERGLOVE SZ 7 BLUE

## (undated) DEVICE — GAUZE SPONGES,16 PLY: Brand: CURITY

## (undated) DEVICE — BETHLEHEM UNIVERSAL MINOR GEN: Brand: CARDINAL HEALTH

## (undated) DEVICE — INTENDED FOR TISSUE SEPARATION, AND OTHER PROCEDURES THAT REQUIRE A SHARP SURGICAL BLADE TO PUNCTURE OR CUT.: Brand: BARD-PARKER SAFETY BLADES SIZE 15, STERILE

## (undated) DEVICE — NEEDLE 25G X 1 1/2

## (undated) DEVICE — SUT VICRYL 3-0 SH 27 IN J416H

## (undated) DEVICE — PLUMEPEN PRO 10FT

## (undated) DEVICE — SUT VICRYL 4-0 PS-2 18 IN J496G

## (undated) DEVICE — GLOVE SRG BIOGEL ECLIPSE 7

## (undated) DEVICE — VIAL DECANTER

## (undated) DEVICE — TUBING SUCTION 5MM X 12 FT

## (undated) DEVICE — MEDI-VAC YANKAUER SUCTION HANDLE W/STRAIGHT TIP & CONTROL VENT: Brand: CARDINAL HEALTH

## (undated) DEVICE — CHLORAPREP HI-LITE 26ML ORANGE

## (undated) DEVICE — 4-PORT MANIFOLD: Brand: NEPTUNE 2